# Patient Record
Sex: MALE | Race: WHITE | NOT HISPANIC OR LATINO | Employment: STUDENT | URBAN - METROPOLITAN AREA
[De-identification: names, ages, dates, MRNs, and addresses within clinical notes are randomized per-mention and may not be internally consistent; named-entity substitution may affect disease eponyms.]

---

## 2023-08-07 ENCOUNTER — HOSPITAL ENCOUNTER (EMERGENCY)
Facility: CLINIC | Age: 18
Discharge: HOME | End: 2023-08-07
Attending: STUDENT IN AN ORGANIZED HEALTH CARE EDUCATION/TRAINING PROGRAM | Admitting: STUDENT IN AN ORGANIZED HEALTH CARE EDUCATION/TRAINING PROGRAM
Payer: COMMERCIAL

## 2023-08-07 VITALS
SYSTOLIC BLOOD PRESSURE: 123 MMHG | DIASTOLIC BLOOD PRESSURE: 82 MMHG | TEMPERATURE: 98.2 F | WEIGHT: 257.8 LBS | RESPIRATION RATE: 16 BRPM | HEART RATE: 68 BPM | OXYGEN SATURATION: 98 %

## 2023-08-07 DIAGNOSIS — S00.91XA ABRASION OF HEAD, INITIAL ENCOUNTER: ICD-10-CM

## 2023-08-07 PROCEDURE — 99283 EMERGENCY DEPT VISIT LOW MDM: CPT | Performed by: STUDENT IN AN ORGANIZED HEALTH CARE EDUCATION/TRAINING PROGRAM

## 2023-08-07 PROCEDURE — 99282 EMERGENCY DEPT VISIT SF MDM: CPT | Performed by: STUDENT IN AN ORGANIZED HEALTH CARE EDUCATION/TRAINING PROGRAM

## 2023-08-08 NOTE — ED PROVIDER NOTES
ED Provider Note  [facility]      History     Chief Complaint   Patient presents with    Head Injury     Hit the top of the head on the bunk bed and was bleeding about an hour and half ago. Small lac on the left top of his head.     HPI  Ade Rodarte is a 17 year old male who presents emergency department due to a scalp laceration after hitting his head on his bunk bed.  Notes this happened about an hour and a half prior to arrival.  Had a small amount of bleeding on the top of his head and wanted to get checked out to make sure he did not need stitches.  No loss of consciousness, no numbness, tingling or weakness.  No nausea or vomiting  Past Medical History  History reviewed. No pertinent past medical history.  History reviewed. No pertinent surgical history.  No current outpatient medications on file.    No Known Allergies  Family History  History reviewed. No pertinent family history.  Social History   Social History     Tobacco Use    Smoking status: Never    Smokeless tobacco: Never   Substance Use Topics    Alcohol use: Never    Drug use: Never         A medically appropriate review of systems was performed with pertinent positives and negatives noted in the HPI, and all other systems negative.    Physical Exam   BP: 123/82  Pulse: 68  Temp: 98.2  F (36.8  C)  Resp: 16  Weight: 116.9 kg (257 lb 12.8 oz)  SpO2: 98 %  Physical Exam  GEN: Well appearing, non toxic, cooperative  HEENT: Small superficial abrasion/superficial laceration to the left anterior occiput well-approximated  CV: well-perfused, normal skin color for ethnicity  PULM: breathing comfortably, in no respiratory distress  ABD: nondistended  EXT: Full range of motion.  No edema.  NEURO: awake, conversant, grossly normal bilateral upper and lower extremity strength & ROM   SKIN: No rashes, ecchymosis, or lacerations  PSYCH: Calm and cooperative, interactive      ED Course, Procedures, & Data      Procedures          No  results found for any visits on 08/07/23.  Medications - No data to display  Labs Ordered and Resulted from Time of ED Arrival to Time of ED Departure - No data to display  No orders to display          Critical care was not performed.     Medical Decision Making  The patient's presentation was of low complexity (an acute and uncomplicated illness or injury).    The patient's evaluation involved:  review of external note(s) from 3+ sources (see separate area of note for details)  review of 3+ test result(s) ordered prior to this encounter (see separate area of note for details)    The patient's management necessitated only low risk treatment.    Assessment & Plan    17-year-old male presents emergency department due to a head injury just prior to arrival.  Has an abrasion/superficial laceration over the left frontal scalp that is well-approximated and no currently bleeding.  No other significant findings concerning for underlying skull fracture.  Low suspicion of intracranial hemorrhage.  At this point in time, due to the very close approximation, will hold on any sutures or staples at this time.  Patient is aware that he will clean and wash the area and watch for signs of infection.  Also aware of return precautions regarding his head injury and will plan for discharge.  Patient is up-to-date on Tdap    I have reviewed the nursing notes. I have reviewed the findings, diagnosis, plan and need for follow up with the patient.    New Prescriptions    No medications on file       Final diagnoses:   Abrasion of head, initial encounter       Geeta Solo MD  [facility] EMERGENCY DEPARTMENT  8/7/2023     Geeta Solo MD  08/08/23 0117     Detail Level: Simple Quality 226: Preventive Care And Screening: Tobacco Use: Screening And Cessation Intervention: Patient screened for tobacco use and is an ex/non-smoker Quality 130: Documentation Of Current Medications In The Medical Record: Current Medications Documented Quality 431: Preventive Care And Screening: Unhealthy Alcohol Use - Screening: Patient not identified as an unhealthy alcohol user when screened for unhealthy alcohol use using a systematic screening method

## 2023-08-08 NOTE — DISCHARGE INSTRUCTIONS
You are seen in the department due to an injury to your head.  You do have a small cut, but nothing that needs stitches on your head.  You have no signs of concussion at this time.    Watch for signs of concussion or seek more severe head injury including significant nausea and vomiting more than twice, numbness, tingling or weakness of your arms or legs, difficulty walking, vision changes or any other concerning symptoms.  If you develop any of these, would recommend return to emergency department for reevaluation    Otherwise, keep the area that is injured clean and dry.  You can use Neosporin or other antibiotic cream on the area.  It will start to get a crust over the's wound and we recommend that you leave this there for ongoing healing.    Return watch for signs of worsening pain, drainage that is not just bloody, or fevers which can be signs of infection

## 2023-09-14 ENCOUNTER — HOSPITAL ENCOUNTER (OUTPATIENT)
Facility: CLINIC | Age: 18
Setting detail: OBSERVATION
Discharge: HOME | End: 2023-09-16
Attending: EMERGENCY MEDICINE | Admitting: STUDENT IN AN ORGANIZED HEALTH CARE EDUCATION/TRAINING PROGRAM
Payer: COMMERCIAL

## 2023-09-14 DIAGNOSIS — K85.90 ACUTE PANCREATITIS, UNSPECIFIED COMPLICATION STATUS, UNSPECIFIED PANCREATITIS TYPE: ICD-10-CM

## 2023-09-14 LAB
ALBUMIN SERPL BCG-MCNC: 4.7 G/DL (ref 3.5–5.2)
ALP SERPL-CCNC: 66 U/L (ref 40–129)
ALT SERPL W P-5'-P-CCNC: 26 U/L (ref 0–50)
ANION GAP SERPL CALCULATED.3IONS-SCNC: 15 MMOL/L (ref 7–15)
AST SERPL W P-5'-P-CCNC: 14 U/L (ref 0–35)
BASOPHILS # BLD AUTO: 0 10E3/UL (ref 0–0.2)
BASOPHILS NFR BLD AUTO: 0 %
BILIRUB SERPL-MCNC: 0.2 MG/DL
BUN SERPL-MCNC: 13.1 MG/DL (ref 6–20)
CALCIUM SERPL-MCNC: 9.5 MG/DL (ref 8.6–10)
CHLORIDE SERPL-SCNC: 104 MMOL/L (ref 98–107)
CREAT SERPL-MCNC: 0.86 MG/DL (ref 0.67–1.17)
DEPRECATED HCO3 PLAS-SCNC: 23 MMOL/L (ref 22–29)
EGFRCR SERPLBLD CKD-EPI 2021: >90 ML/MIN/1.73M2
EOSINOPHIL # BLD AUTO: 0.1 10E3/UL (ref 0–0.7)
EOSINOPHIL NFR BLD AUTO: 1 %
ERYTHROCYTE [DISTWIDTH] IN BLOOD BY AUTOMATED COUNT: 13.4 % (ref 10–15)
GLUCOSE SERPL-MCNC: 99 MG/DL (ref 70–99)
HCT VFR BLD AUTO: 45.6 % (ref 40–53)
HGB BLD-MCNC: 15.1 G/DL (ref 13.3–17.7)
IMM GRANULOCYTES # BLD: 0 10E3/UL
IMM GRANULOCYTES NFR BLD: 0 %
LIPASE SERPL-CCNC: 1449 U/L (ref 13–60)
LYMPHOCYTES # BLD AUTO: 2 10E3/UL (ref 0.8–5.3)
LYMPHOCYTES NFR BLD AUTO: 28 %
MCH RBC QN AUTO: 28.8 PG (ref 26.5–33)
MCHC RBC AUTO-ENTMCNC: 33.1 G/DL (ref 31.5–36.5)
MCV RBC AUTO: 87 FL (ref 78–100)
MONOCYTES # BLD AUTO: 0.3 10E3/UL (ref 0–1.3)
MONOCYTES NFR BLD AUTO: 5 %
NEUTROPHILS # BLD AUTO: 4.6 10E3/UL (ref 1.6–8.3)
NEUTROPHILS NFR BLD AUTO: 66 %
NRBC # BLD AUTO: 0 10E3/UL
NRBC BLD AUTO-RTO: 0 /100
PLATELET # BLD AUTO: 228 10E3/UL (ref 150–450)
POTASSIUM SERPL-SCNC: 4.1 MMOL/L (ref 3.4–5.3)
PROT SERPL-MCNC: 7.8 G/DL (ref 6.4–8.3)
RBC # BLD AUTO: 5.24 10E6/UL (ref 4.4–5.9)
SODIUM SERPL-SCNC: 142 MMOL/L (ref 136–145)
WBC # BLD AUTO: 6.9 10E3/UL (ref 4–11)

## 2023-09-14 PROCEDURE — 99285 EMERGENCY DEPT VISIT HI MDM: CPT | Performed by: EMERGENCY MEDICINE

## 2023-09-14 PROCEDURE — 99285 EMERGENCY DEPT VISIT HI MDM: CPT | Mod: 25 | Performed by: EMERGENCY MEDICINE

## 2023-09-14 PROCEDURE — 96360 HYDRATION IV INFUSION INIT: CPT | Performed by: EMERGENCY MEDICINE

## 2023-09-14 PROCEDURE — 84478 ASSAY OF TRIGLYCERIDES: CPT | Performed by: STUDENT IN AN ORGANIZED HEALTH CARE EDUCATION/TRAINING PROGRAM

## 2023-09-14 PROCEDURE — 36415 COLL VENOUS BLD VENIPUNCTURE: CPT | Performed by: EMERGENCY MEDICINE

## 2023-09-14 PROCEDURE — 83690 ASSAY OF LIPASE: CPT | Performed by: EMERGENCY MEDICINE

## 2023-09-14 PROCEDURE — 85025 COMPLETE CBC W/AUTO DIFF WBC: CPT | Performed by: EMERGENCY MEDICINE

## 2023-09-14 PROCEDURE — 250N000013 HC RX MED GY IP 250 OP 250 PS 637: Performed by: EMERGENCY MEDICINE

## 2023-09-14 PROCEDURE — 80053 COMPREHEN METABOLIC PANEL: CPT | Performed by: EMERGENCY MEDICINE

## 2023-09-14 PROCEDURE — 258N000003 HC RX IP 258 OP 636: Performed by: EMERGENCY MEDICINE

## 2023-09-14 RX ORDER — MAGNESIUM HYDROXIDE/ALUMINUM HYDROXICE/SIMETHICONE 120; 1200; 1200 MG/30ML; MG/30ML; MG/30ML
15 SUSPENSION ORAL ONCE
Status: COMPLETED | OUTPATIENT
Start: 2023-09-14 | End: 2023-09-14

## 2023-09-14 RX ADMIN — ALUMINUM HYDROXIDE, MAGNESIUM HYDROXIDE, AND SIMETHICONE 15 ML: 200; 200; 20 SUSPENSION ORAL at 21:58

## 2023-09-14 RX ADMIN — SODIUM CHLORIDE, POTASSIUM CHLORIDE, SODIUM LACTATE AND CALCIUM CHLORIDE 1000 ML: 600; 310; 30; 20 INJECTION, SOLUTION INTRAVENOUS at 22:49

## 2023-09-14 ASSESSMENT — ACTIVITIES OF DAILY LIVING (ADL)
ADLS_ACUITY_SCORE: 35
ADLS_ACUITY_SCORE: 33

## 2023-09-14 NOTE — LETTER
[facility] [address]  [address]  [facility] [address]  Phone: [phone]  Fax: [phone]    September 16, 2023        Ade Rodarte  [address] [facility] [address]          To whom it may concern:    RE: Ade Rodarte    Patient was hospitalized under my care at the [facility] from September 14-16, 2023.  Patient may return to school and work September 17, 2023 with the following:  Ability to sit and rest as needed.    Please contact me for questions or concerns.      Sincerely,        Misa Jorge MD

## 2023-09-15 ENCOUNTER — APPOINTMENT (OUTPATIENT)
Dept: ULTRASOUND IMAGING | Facility: CLINIC | Age: 18
End: 2023-09-15
Attending: EMERGENCY MEDICINE
Payer: COMMERCIAL

## 2023-09-15 PROBLEM — K85.90 ACUTE PANCREATITIS, UNSPECIFIED COMPLICATION STATUS, UNSPECIFIED PANCREATITIS TYPE: Status: ACTIVE | Noted: 2023-09-15

## 2023-09-15 LAB
ALBUMIN SERPL BCG-MCNC: 3.9 G/DL (ref 3.5–5.2)
ALBUMIN UR-MCNC: 10 MG/DL
ALP SERPL-CCNC: 59 U/L (ref 40–129)
ALT SERPL W P-5'-P-CCNC: 22 U/L (ref 0–50)
ANION GAP SERPL CALCULATED.3IONS-SCNC: 7 MMOL/L (ref 7–15)
APPEARANCE UR: CLEAR
AST SERPL W P-5'-P-CCNC: 14 U/L (ref 0–35)
BASOPHILS # BLD AUTO: 0 10E3/UL (ref 0–0.2)
BASOPHILS NFR BLD AUTO: 0 %
BILIRUB SERPL-MCNC: 0.3 MG/DL
BILIRUB UR QL STRIP: NEGATIVE
BUN SERPL-MCNC: 9 MG/DL (ref 6–20)
CALCIUM SERPL-MCNC: 9.2 MG/DL (ref 8.6–10)
CHLORIDE SERPL-SCNC: 105 MMOL/L (ref 98–107)
COLOR UR AUTO: YELLOW
CREAT SERPL-MCNC: 0.83 MG/DL (ref 0.67–1.17)
DEPRECATED HCO3 PLAS-SCNC: 25 MMOL/L (ref 22–29)
EGFRCR SERPLBLD CKD-EPI 2021: >90 ML/MIN/1.73M2
EOSINOPHIL # BLD AUTO: 0.2 10E3/UL (ref 0–0.7)
EOSINOPHIL NFR BLD AUTO: 2 %
ERYTHROCYTE [DISTWIDTH] IN BLOOD BY AUTOMATED COUNT: 13.4 % (ref 10–15)
GLUCOSE SERPL-MCNC: 104 MG/DL (ref 70–99)
GLUCOSE UR STRIP-MCNC: NEGATIVE MG/DL
HCT VFR BLD AUTO: 41.6 % (ref 40–53)
HGB BLD-MCNC: 14.1 G/DL (ref 13.3–17.7)
HGB UR QL STRIP: NEGATIVE
IMM GRANULOCYTES # BLD: 0 10E3/UL
IMM GRANULOCYTES NFR BLD: 0 %
KETONES UR STRIP-MCNC: NEGATIVE MG/DL
LEUKOCYTE ESTERASE UR QL STRIP: NEGATIVE
LYMPHOCYTES # BLD AUTO: 2.1 10E3/UL (ref 0.8–5.3)
LYMPHOCYTES NFR BLD AUTO: 30 %
MCH RBC QN AUTO: 29 PG (ref 26.5–33)
MCHC RBC AUTO-ENTMCNC: 33.9 G/DL (ref 31.5–36.5)
MCV RBC AUTO: 86 FL (ref 78–100)
MONOCYTES # BLD AUTO: 0.5 10E3/UL (ref 0–1.3)
MONOCYTES NFR BLD AUTO: 7 %
MUCOUS THREADS #/AREA URNS LPF: PRESENT /LPF
NEUTROPHILS # BLD AUTO: 4.1 10E3/UL (ref 1.6–8.3)
NEUTROPHILS NFR BLD AUTO: 61 %
NITRATE UR QL: NEGATIVE
NRBC # BLD AUTO: 0 10E3/UL
NRBC BLD AUTO-RTO: 0 /100
PH UR STRIP: 7 [PH] (ref 5–7)
PLATELET # BLD AUTO: 205 10E3/UL (ref 150–450)
POTASSIUM SERPL-SCNC: 4.2 MMOL/L (ref 3.4–5.3)
PROT SERPL-MCNC: 6.8 G/DL (ref 6.4–8.3)
RBC # BLD AUTO: 4.86 10E6/UL (ref 4.4–5.9)
RBC URINE: <1 /HPF
SARS-COV-2 RNA RESP QL NAA+PROBE: NEGATIVE
SODIUM SERPL-SCNC: 137 MMOL/L (ref 136–145)
SP GR UR STRIP: 1.03 (ref 1–1.03)
TRIGL SERPL-MCNC: 96 MG/DL
UROBILINOGEN UR STRIP-MCNC: NORMAL MG/DL
WBC # BLD AUTO: 6.9 10E3/UL (ref 4–11)
WBC URINE: <1 /HPF

## 2023-09-15 PROCEDURE — 96361 HYDRATE IV INFUSION ADD-ON: CPT

## 2023-09-15 PROCEDURE — 81001 URINALYSIS AUTO W/SCOPE: CPT | Performed by: EMERGENCY MEDICINE

## 2023-09-15 PROCEDURE — 87635 SARS-COV-2 COVID-19 AMP PRB: CPT | Performed by: STUDENT IN AN ORGANIZED HEALTH CARE EDUCATION/TRAINING PROGRAM

## 2023-09-15 PROCEDURE — 250N000013 HC RX MED GY IP 250 OP 250 PS 637: Performed by: PHYSICIAN ASSISTANT

## 2023-09-15 PROCEDURE — 99207 PR APP CREDIT; MD BILLING SHARED VISIT: CPT | Performed by: INTERNAL MEDICINE

## 2023-09-15 PROCEDURE — G0378 HOSPITAL OBSERVATION PER HR: HCPCS

## 2023-09-15 PROCEDURE — 80053 COMPREHEN METABOLIC PANEL: CPT | Performed by: STUDENT IN AN ORGANIZED HEALTH CARE EDUCATION/TRAINING PROGRAM

## 2023-09-15 PROCEDURE — 85025 COMPLETE CBC W/AUTO DIFF WBC: CPT | Performed by: STUDENT IN AN ORGANIZED HEALTH CARE EDUCATION/TRAINING PROGRAM

## 2023-09-15 PROCEDURE — 76705 ECHO EXAM OF ABDOMEN: CPT

## 2023-09-15 PROCEDURE — 258N000003 HC RX IP 258 OP 636: Performed by: STUDENT IN AN ORGANIZED HEALTH CARE EDUCATION/TRAINING PROGRAM

## 2023-09-15 PROCEDURE — 99222 1ST HOSP IP/OBS MODERATE 55: CPT | Performed by: STUDENT IN AN ORGANIZED HEALTH CARE EDUCATION/TRAINING PROGRAM

## 2023-09-15 PROCEDURE — 36415 COLL VENOUS BLD VENIPUNCTURE: CPT | Performed by: STUDENT IN AN ORGANIZED HEALTH CARE EDUCATION/TRAINING PROGRAM

## 2023-09-15 RX ORDER — NALOXONE HYDROCHLORIDE 0.4 MG/ML
0.4 INJECTION, SOLUTION INTRAMUSCULAR; INTRAVENOUS; SUBCUTANEOUS
Status: DISCONTINUED | OUTPATIENT
Start: 2023-09-15 | End: 2023-09-16 | Stop reason: HOSPADM

## 2023-09-15 RX ORDER — PROCHLORPERAZINE MALEATE 5 MG
10 TABLET ORAL EVERY 6 HOURS PRN
Status: DISCONTINUED | OUTPATIENT
Start: 2023-09-15 | End: 2023-09-16 | Stop reason: HOSPADM

## 2023-09-15 RX ORDER — LIDOCAINE 40 MG/G
CREAM TOPICAL
Status: DISCONTINUED | OUTPATIENT
Start: 2023-09-15 | End: 2023-09-16 | Stop reason: HOSPADM

## 2023-09-15 RX ORDER — ACETAMINOPHEN 325 MG/1
975 TABLET ORAL EVERY 6 HOURS PRN
Status: DISCONTINUED | OUTPATIENT
Start: 2023-09-15 | End: 2023-09-16 | Stop reason: HOSPADM

## 2023-09-15 RX ORDER — KETOROLAC TROMETHAMINE 30 MG/ML
30 INJECTION, SOLUTION INTRAMUSCULAR; INTRAVENOUS EVERY 6 HOURS PRN
Status: DISCONTINUED | OUTPATIENT
Start: 2023-09-15 | End: 2023-09-16 | Stop reason: HOSPADM

## 2023-09-15 RX ORDER — NALOXONE HYDROCHLORIDE 0.4 MG/ML
0.2 INJECTION, SOLUTION INTRAMUSCULAR; INTRAVENOUS; SUBCUTANEOUS
Status: DISCONTINUED | OUTPATIENT
Start: 2023-09-15 | End: 2023-09-16 | Stop reason: HOSPADM

## 2023-09-15 RX ORDER — ONDANSETRON 4 MG/1
4 TABLET, ORALLY DISINTEGRATING ORAL EVERY 6 HOURS PRN
Status: DISCONTINUED | OUTPATIENT
Start: 2023-09-15 | End: 2023-09-16 | Stop reason: HOSPADM

## 2023-09-15 RX ORDER — ONDANSETRON 2 MG/ML
4 INJECTION INTRAMUSCULAR; INTRAVENOUS EVERY 6 HOURS PRN
Status: DISCONTINUED | OUTPATIENT
Start: 2023-09-15 | End: 2023-09-16 | Stop reason: HOSPADM

## 2023-09-15 RX ORDER — PROCHLORPERAZINE 25 MG
25 SUPPOSITORY, RECTAL RECTAL EVERY 12 HOURS PRN
Status: DISCONTINUED | OUTPATIENT
Start: 2023-09-15 | End: 2023-09-16 | Stop reason: HOSPADM

## 2023-09-15 RX ORDER — SODIUM CHLORIDE, SODIUM LACTATE, POTASSIUM CHLORIDE, CALCIUM CHLORIDE 600; 310; 30; 20 MG/100ML; MG/100ML; MG/100ML; MG/100ML
INJECTION, SOLUTION INTRAVENOUS CONTINUOUS
Status: DISCONTINUED | OUTPATIENT
Start: 2023-09-15 | End: 2023-09-15

## 2023-09-15 RX ORDER — KETOROLAC TROMETHAMINE 30 MG/ML
30 INJECTION, SOLUTION INTRAMUSCULAR; INTRAVENOUS EVERY 6 HOURS PRN
Status: DISCONTINUED | OUTPATIENT
Start: 2023-09-15 | End: 2023-09-15

## 2023-09-15 RX ORDER — OXYCODONE HYDROCHLORIDE 5 MG/1
5 TABLET ORAL EVERY 6 HOURS PRN
Status: DISCONTINUED | OUTPATIENT
Start: 2023-09-15 | End: 2023-09-16 | Stop reason: HOSPADM

## 2023-09-15 RX ORDER — BENZONATATE 100 MG/1
100 CAPSULE ORAL 3 TIMES DAILY PRN
Status: DISCONTINUED | OUTPATIENT
Start: 2023-09-15 | End: 2023-09-16 | Stop reason: HOSPADM

## 2023-09-15 RX ORDER — HYDROMORPHONE HCL IN WATER/PF 6 MG/30 ML
0.2 PATIENT CONTROLLED ANALGESIA SYRINGE INTRAVENOUS
Status: DISCONTINUED | OUTPATIENT
Start: 2023-09-15 | End: 2023-09-16 | Stop reason: HOSPADM

## 2023-09-15 RX ADMIN — Medication 1 LOZENGE: at 23:17

## 2023-09-15 RX ADMIN — SODIUM CHLORIDE, POTASSIUM CHLORIDE, SODIUM LACTATE AND CALCIUM CHLORIDE: 600; 310; 30; 20 INJECTION, SOLUTION INTRAVENOUS at 04:34

## 2023-09-15 ASSESSMENT — ACTIVITIES OF DAILY LIVING (ADL)
ADLS_ACUITY_SCORE: 35
ADLS_ACUITY_SCORE: 31
ADLS_ACUITY_SCORE: 35

## 2023-09-15 NOTE — PHARMACY-ADMISSION MEDICATION HISTORY
Pharmacist Admission Medication History    Admission medication history is complete. The information provided in this note is only as accurate as the sources available at the time of the update.    Medication reconciliation/reorder completed by provider prior to medication history? No    Information Source(s): Patient via in-person    Pertinent Information: patient reports taking to prescription or over the counter medications at this time.     Changes made to PTA medication list:  Added: None  Deleted: None  Changed: None    Medication Affordability: unable to assess     Allergies reviewed with patient and updates made in EHR: yes    Medication History Completed By: Karla Gonzalez RPH 9/15/2023 2:36 PM    Prior to Admission medications    Not on File

## 2023-09-15 NOTE — H&P
[facility]    History and Physical - Hospitalist Service       Date of Admission:  9/14/2023    Assessment & Plan      Ade Rodarte is a 17 year old male with no significant past medical history presenting with abdominal pain, subsequently found to have a lipase of 1,449, consistent with acute pancreatitis.     #Acute Pancreatitis  Presented with abdominal pain. Lipase of 1,449, consistent with acute pancreatitis. RUQ ultrasound normal. Differential etiologies includes EtOH (however, patient denies alcohol use and has a nl abdominal ultrasound), gallstones (however, alkaline phosphatase wnl and has a nl abdominal ultrasound), hypertriglyceridemia (triglycerides elevated, but not to the level that would account for pancreatitis), medication-induced (on no medications chronically), hypercalcemia (calcium within normal limits w/ normal albumin), anatomical (although visualized portion on ultrasound were normal), infections (no localizing symptoms of infection), toxins (no reported toxin exposure), and genetic risk.  - Regular diet (has been tolerating)  - mIVF (LR at 250 ml/hr)  - pain control with acetaminophen 975 mg q6H (mild pain), Toradol 30 mg IV (mild pain), oxycodone 5mg q6Hr (moderate pain), and dilaudid 0.2mg once prn (severe breakthrough pain).   - Discussed patient concerns about whether this might recur and if there would be an outpatient treatment plan. Provided reassurance and education.     Diet:  Regular diet  DVT Prophylaxis: Low Risk/Ambulatory with no VTE prophylaxis indicated  Martinez Catheter: Not present  Lines: None     Cardiac Monitoring: None  Code Status:  Full code    Clinically Significant Risk Factors Present on Admission                                  Disposition Plan      Expected Discharge Date: 09/17/2023                  Gilmar Parry MD  Hospitalist Service  [facility]  Securely message  with Josue (more info)  Text page via [facility] Paging/Directory     ______________________________________________________________________    Chief Complaint   Abdominal pain     History is obtained from the patient    History of Present Illness   Ade Rodarte is a 17 year old male with no significant past medical history presenting with abdominal pain that lasted for several seconds, then would go away. He continued to eat normally, nor did he experience nausea or vomiting. However, on the day of presentation, he had worsening and persistent abdominal pain and presented to the emergency department.    In the emergency department, his vital signs were within acceptable limits. CMP and CBC were normal. Lipase was elevated at 1,499. Abdominal ultrasound was normal and without abnormality. He was given a lactated ringer's bolus and a maalox suspension. He was admitted to the inpatient unit for acute pancreatitis.     Past Medical History    None    Past Surgical History   None    Prior to Admission Medications   None        Physical Exam   Weight: 262 lbs 0 oz  Vital signs: Temp:  [98.3  F (36.8  C)] 98.3  F (36.8  C)  Pulse:  [95] 95  Resp:  [16] 16  BP: (129)/(72) 129/72  SpO2:  [97 %] 97 %  General Appearance: Well developed, well nourished, in no acute distress.   Skin: No rashes, ulcerations, or petechiae.  HEENT: EOMI intact, anicteric sclera, clear conjunctiva, normal external ear anatomy, normal nose anatomy, no lesions, scars, or masses.   Neck: No masses, supple  Cardiovascular: RRR, no murmurs, rubs, or gallops.  Lungs: clear to ascultation bilaterally, no rales, wheezes or rhonchi.   Abdomen: Non-tender, non-distended abdomen.   Musculoskeletal: Muscle tone normal.  Neurologic: Alert and oriented x 3. Normal affect. CX II-XII grossly intact.      Medical Decision Making       45 MINUTES SPENT BY ME on the date of service doing chart review, history, exam, documentation & further activities per the note.      Data   Results for orders placed or performed during the hospital encounter of 09/14/23 (from the past 24 hour(s))   CBC with Platelets & Differential    Narrative    The following orders were created for panel order CBC with Platelets & Differential.  Procedure                               Abnormality         Status                     ---------                               -----------         ------                     CBC with platelets and d...[[id number]]                      Final result                 Please view results for these tests on the individual orders.   Comprehensive metabolic panel   Result Value Ref Range    Sodium 142 136 - 145 mmol/L    Potassium 4.1 3.4 - 5.3 mmol/L    Chloride 104 98 - 107 mmol/L    Carbon Dioxide (CO2) 23 22 - 29 mmol/L    Anion Gap 15 7 - 15 mmol/L    Urea Nitrogen 13.1 6.0 - 20.0 mg/dL    Creatinine 0.86 0.67 - 1.17 mg/dL    Calcium 9.5 8.6 - 10.0 mg/dL    Glucose 99 70 - 99 mg/dL    Alkaline Phosphatase 66 40 - 129 U/L    AST 14 0 - 35 U/L    ALT 26 0 - 50 U/L    Protein Total 7.8 6.4 - 8.3 g/dL    Albumin 4.7 3.5 - 5.2 g/dL    Bilirubin Total 0.2 <=1.2 mg/dL    GFR Estimate >90 >60 mL/min/1.73m2   Lipase   Result Value Ref Range    Lipase 1,449 (H) 13 - 60 U/L   CBC with platelets and differential   Result Value Ref Range    WBC Count 6.9 4.0 - 11.0 10e3/uL    RBC Count 5.24 4.40 - 5.90 10e6/uL    Hemoglobin 15.1 13.3 - 17.7 g/dL    Hematocrit 45.6 40.0 - 53.0 %    MCV 87 78 - 100 fL    MCH 28.8 26.5 - 33.0 pg    MCHC 33.1 31.5 - 36.5 g/dL    RDW 13.4 10.0 - 15.0 %    Platelet Count 228 150 - 450 10e3/uL    % Neutrophils 66 %    % Lymphocytes 28 %    % Monocytes 5 %    % Eosinophils 1 %    % Basophils 0 %    % Immature Granulocytes 0 %    NRBCs per 100 WBC 0 <1 /100    Absolute Neutrophils 4.6 1.6 - 8.3 10e3/uL    Absolute Lymphocytes 2.0 0.8 - 5.3 10e3/uL    Absolute Monocytes 0.3 0.0 - 1.3 10e3/uL    Absolute Eosinophils 0.1 0.0 - 0.7 10e3/uL    Absolute  Basophils 0.0 0.0 - 0.2 10e3/uL    Absolute Immature Granulocytes 0.0 <=0.4 10e3/uL    Absolute NRBCs 0.0 10e3/uL   Abdomen US, limited (RUQ only)    Narrative    EXAM: US ABDOMEN LIMITED  LOCATION: [facility]  DATE: 9/15/2023    INDICATION: abd pain, elevated lipase, ebal for gallstone  COMPARISON: None.  TECHNIQUE: Limited abdominal ultrasound.    FINDINGS:    GALLBLADDER: Normal. No gallstones, wall thickening, or pericholecystic fluid. Negative sonographic Venegas's sign.    BILE DUCTS: No biliary dilatation. The common duct measures 2 mm.    LIVER: Normal parenchyma with smooth contour. No focal mass.    RIGHT KIDNEY: No hydronephrosis.    PANCREAS: The visualized portions are normal.    No ascites.      Impression    IMPRESSION:  1.  Normal limited abdominal ultrasound.

## 2023-09-15 NOTE — ED PROVIDER NOTES
"    [facility] EMERGENCY DEPARTMENT ([facility])    9/14/23      ED PROVIDER NOTE      History     Chief Complaint   Patient presents with    Abdominal Pain     HPI    Ade Rodarte is a 17 year old male who presents to the ED for evaluation of intermittent epigastric abdominal pain. Patient reports that this pain has been going on for 1 to 2 weeks.  It is occurring mainly on a daily basis, he notices it for perhaps 10 seconds at a time when he does notice it.  He describes it as a sharp burning.  Does not seem to be affected by whether he eats or what he eats.  He denies any nausea or vomiting.  No diarrhea, no melena or hematochezia.  Last BM was earlier today and was normal.  Patient has not taken anything for the symptoms.    He denies fevers or chills, nasal congestion or sore throat, no cough, shortness of breath, or chest pain.  No dysuria or hematuria.  No previous abdominal surgeries.  Patient denies alcohol use, denies NSAID use.  No prior history of peptic ulcer disease.    Past Medical History  History reviewed. No pertinent past medical history.  History reviewed. No pertinent surgical history.  No current outpatient medications on file.    No Known Allergies  Family History  History reviewed. No pertinent family history.  Social History   Social History     Tobacco Use    Smoking status: Never    Smokeless tobacco: Never   Substance Use Topics    Alcohol use: Never    Drug use: Never      Past medical history, past surgical history, medications, allergies, family history, and social history were reviewed with the patient. No additional pertinent items.      A medically appropriate review of systems was performed with pertinent positives and negatives noted in the HPI, and all other systems negative.    Physical Exam   BP: 129/72  Pulse: 95  Temp: 98.3  F (36.8  C)  Resp: 16  Height: 203.2 cm (6' 8\")  Weight: 118.8 kg (262 lb)  SpO2: 97 %  Physical Exam  Vitals and nursing note reviewed. "   Constitutional:       General: He is not in acute distress.     Appearance: He is not diaphoretic.      Comments: Adult male, sitting up in bed, no acute distress   HENT:      Head: Atraumatic.      Mouth/Throat:      Mouth: Mucous membranes are moist.      Pharynx: Oropharynx is clear. No oropharyngeal exudate.   Eyes:      General: No scleral icterus.     Pupils: Pupils are equal, round, and reactive to light.   Cardiovascular:      Rate and Rhythm: Normal rate.      Pulses: Normal pulses.      Heart sounds: No murmur heard.  Pulmonary:      Effort: No respiratory distress.      Breath sounds: Normal breath sounds.   Abdominal:      General: Bowel sounds are normal.      Palpations: Abdomen is soft.      Tenderness: There is abdominal tenderness. There is no guarding or rebound.      Comments: Abdomen is flat, soft, mildly tender to palpation in the epigastrium without guarding or rebound, slightly hypoactive bowel sounds   Musculoskeletal:         General: No tenderness.   Skin:     General: Skin is warm.      Findings: No rash.   Neurological:      General: No focal deficit present.   Psychiatric:         Mood and Affect: Mood normal.         ED Course, Procedures, & Data      Procedures                   Results for orders placed or performed during the hospital encounter of 09/14/23   Comprehensive metabolic panel     Status: Normal   Result Value Ref Range    Sodium 142 136 - 145 mmol/L    Potassium 4.1 3.4 - 5.3 mmol/L    Chloride 104 98 - 107 mmol/L    Carbon Dioxide (CO2) 23 22 - 29 mmol/L    Anion Gap 15 7 - 15 mmol/L    Urea Nitrogen 13.1 6.0 - 20.0 mg/dL    Creatinine 0.86 0.67 - 1.17 mg/dL    Calcium 9.5 8.6 - 10.0 mg/dL    Glucose 99 70 - 99 mg/dL    Alkaline Phosphatase 66 40 - 129 U/L    AST 14 0 - 35 U/L    ALT 26 0 - 50 U/L    Protein Total 7.8 6.4 - 8.3 g/dL    Albumin 4.7 3.5 - 5.2 g/dL    Bilirubin Total 0.2 <=1.2 mg/dL    GFR Estimate >90 >60 mL/min/1.73m2   Lipase     Status: Abnormal    Result Value Ref Range    Lipase 1,449 (H) 13 - 60 U/L   CBC with platelets and differential     Status: None   Result Value Ref Range    WBC Count 6.9 4.0 - 11.0 10e3/uL    RBC Count 5.24 4.40 - 5.90 10e6/uL    Hemoglobin 15.1 13.3 - 17.7 g/dL    Hematocrit 45.6 40.0 - 53.0 %    MCV 87 78 - 100 fL    MCH 28.8 26.5 - 33.0 pg    MCHC 33.1 31.5 - 36.5 g/dL    RDW 13.4 10.0 - 15.0 %    Platelet Count 228 150 - 450 10e3/uL    % Neutrophils 66 %    % Lymphocytes 28 %    % Monocytes 5 %    % Eosinophils 1 %    % Basophils 0 %    % Immature Granulocytes 0 %    NRBCs per 100 WBC 0 <1 /100    Absolute Neutrophils 4.6 1.6 - 8.3 10e3/uL    Absolute Lymphocytes 2.0 0.8 - 5.3 10e3/uL    Absolute Monocytes 0.3 0.0 - 1.3 10e3/uL    Absolute Eosinophils 0.1 0.0 - 0.7 10e3/uL    Absolute Basophils 0.0 0.0 - 0.2 10e3/uL    Absolute Immature Granulocytes 0.0 <=0.4 10e3/uL    Absolute NRBCs 0.0 10e3/uL   CBC with Platelets & Differential     Status: None    Narrative    The following orders were created for panel order CBC with Platelets & Differential.  Procedure                               Abnormality         Status                     ---------                               -----------         ------                     CBC with platelets and d...[[id number]]                      Final result                 Please view results for these tests on the individual orders.     Medications   alum & mag hydroxide-simethicone (MAALOX) suspension 15 mL (15 mLs Oral $Given 9/14/23 5892)   lactated ringers BOLUS 1,000 mL (0 mLs Intravenous Stopped 9/15/23 0003)     Labs Ordered and Resulted from Time of ED Arrival to Time of ED Departure   LIPASE - Abnormal       Result Value    Lipase 1,449 (*)    COMPREHENSIVE METABOLIC PANEL - Normal    Sodium 142      Potassium 4.1      Chloride 104      Carbon Dioxide (CO2) 23      Anion Gap 15      Urea Nitrogen 13.1      Creatinine 0.86      Calcium 9.5      Glucose 99      Alkaline Phosphatase 66       AST 14      ALT 26      Protein Total 7.8      Albumin 4.7      Bilirubin Total 0.2      GFR Estimate >90     CBC WITH PLATELETS AND DIFFERENTIAL    WBC Count 6.9      RBC Count 5.24      Hemoglobin 15.1      Hematocrit 45.6      MCV 87      MCH 28.8      MCHC 33.1      RDW 13.4      Platelet Count 228      % Neutrophils 66      % Lymphocytes 28      % Monocytes 5      % Eosinophils 1      % Basophils 0      % Immature Granulocytes 0      NRBCs per 100 WBC 0      Absolute Neutrophils 4.6      Absolute Lymphocytes 2.0      Absolute Monocytes 0.3      Absolute Eosinophils 0.1      Absolute Basophils 0.0      Absolute Immature Granulocytes 0.0      Absolute NRBCs 0.0     ROUTINE UA WITH MICROSCOPIC REFLEX TO CULTURE     Abdomen US, limited (RUQ only)    (Results Pending)          Critical care was not performed.     Medical Decision Making  The patient's presentation was of moderate complexity (an acute illness with systemic symptoms).    The patient's evaluation involved:  review of external note(s) from 1 sources (see separate area of note for details)  ordering and/or review of 3+ test(s) in this encounter (see separate area of note for details)    The patient's management necessitated high risk (a decision regarding hospitalization).    Assessment & Plan    This is a 17 year old male who presents to the ED for evaluation of intermittent epigastric abdominal pain. On my evaluation, patient is alert, cooperative, does not appear to be in any acute distress.  Vital signs are within normal limits.  He has mild tenderness to palpation in the epigastrium without guarding or rebound, remainder of abdominal exam is benign and unremarkable.    Differential diagnosis could include gastritis, peptic ulcer disease, GERD, also need to consider pancreatitis, hepatitis, biliary etiology, bowel obstruction, amongst many others.  We did establish IV access and did draw blood for laboratory analysis.  CBC is WNL, CMP is WNL,  lipase is elevated at 1449.      Initially patient was given a GI cocktail.  I did review the patient's test results with him where I explained that he does have evidence of acute pancreatitis.  He is feeling improved and does not have pain at present.  I have ordered a liter of Lactated Ringer's for him.  Ultrasound is pending.  Need to consider the possibility of obstructing stone though the clinical story does not seem consistent with this.  Alternatively, some sort of anatomic abnormality of the pancreas could also be possible.    Given the patient's significantly elevated lipase, I would recommend that he be admitted to the hospital.  Patient is agreeable to this.  Patient will be signed out to overnight physician, Dr. Frost, to follow-up on ultrasound result, depending on this he may need to be admitted to the [facility] (if there is ultrasound evidence of obstructing stone which may require a procedure), or he could be admitted to [facility] if there is no evidence of obstructing stone.    I have reviewed the nursing notes. I have reviewed the findings, diagnosis, plan and need for follow up with the patient.    New Prescriptions    No medications on file       Final diagnoses:   Acute pancreatitis, unspecified complication status, unspecified pancreatitis type   I, Angie Swanson, am serving as a trained medical scribe to document services personally performed by Danisha Odonnell MD, based on the provider's statements to me.      I, Danisha Odonnell MD, was physically present and have reviewed and verified the accuracy of this note documented by Angie Swanson.     Danisha Odonnell MD  [facility] EMERGENCY DEPARTMENT  9/14/2023     Danisha Odonnell MD  09/15/23 0021

## 2023-09-15 NOTE — PROGRESS NOTES
[facility]    Medicine Progress Note - Hospitalist Service, GOLD TEAM 21    Date of Admission:  9/14/2023    Assessment & Plan   Ade Rodarte is a 17 year old male with no significant past medical history presenting with abdominal pain, subsequently found to have a lipase of 1,449, consistent with acute pancreatitis.      #Acute Pancreatitis: Etiology uncertain  Presented with abdominal pain. Lipase of 1,449, consistent with acute pancreatitis. RUQ ultrasound normal. Differential etiologies includes EtOH (however, patient denies alcohol use and has a nl abdominal ultrasound), gallstones (however, alkaline phosphatase wnl and has a nl abdominal ultrasound), hypertriglyceridemia (triglycerides elevated, but not to the level that would account for pancreatitis), medication-induced (on no medications chronically), hypercalcemia (calcium within normal limits w/ normal albumin), anatomical (although visualized portion on ultrasound were normal), infections (no localizing symptoms of infection), toxins (no reported toxin exposure), and genetic risk.  -Continue regular diet (has been tolerating)  - Will discontinue maintenance IV fluid as patient tolerating p.o. intake today without nausea or vomiting  - Pain control with acetaminophen 975 mg q6H (mild pain), Toradol 30 mg IV (mild pain), oxycodone 5mg q6Hr (moderate pain), and dilaudid 0.2mg once prn (severe breakthrough pain)--> patient not using any of these agents for pain  - Discussed with patient today given uncertainty of cause for pancreatitis and young age of onset, recommend referral to GI clinic (panc/bili clinic) for further evaluation of congenital or autoimmune predisposition for acute pancreatitis and patient agreeable.       Diet: Regular Diet Adult    DVT Prophylaxis: Low Risk/Ambulatory with no VTE prophylaxis indicated  Martinez Catheter: Not present  Lines: None     Cardiac Monitoring: None  Code Status: Full  "Code      Clinically Significant Risk Factors Present on Admission                                  Disposition: Discharge to home as early as tomorrow 9/16 if patient continues with pain control, toleration of p.o. intake and able to maintain hydration with p.o. intake.    Misa Jorge MD  Hospitalist Service, GOLD TEAM 21  [facility]  Securely message with [employer] (more info)  Text page via [facility] Paging/Directory   See signed in provider for up to date coverage information  ______________________________________________________________________    Interval History   Patient notes he is feeling reasonably well overall, pain \"comes and goes\" but he has not needed any medication for pain so far during hospital stay.  Patient denies nausea, ate a full breakfast without increased pain or onset of nausea.  No dyspnea, ambulating without difficulty.    Physical Exam   Vital Signs: Temp: 98.4  F (36.9  C) Temp src: Oral BP: 126/84 Pulse: 82   Resp: 16 SpO2: 99 % O2 Device: None (Room air)    Weight: 262 lbs 0 oz    Gen: NAD, sitting up comfortably in bed, pleasant and cooperative with interview and exam  HEENT: normocephalic, no scleral icterus, no perioral lesions, oral mucosa moist  CV: RRR at time of exam  Pulm: good air movement bilaterally without wheezing  Abd: soft, +bs, mild tenderness to palpation in mid epigastric and right upper quadrant without rebound or guarding, nondistended  LE: no edema bilaterally  Skin: no rash or jaundice on limited exam  Neuro: AOx3, no tremor, gait stable  Psych: mood-affect congruent      Medical Decision Making             Data   Most Recent 3 CBC's:  Recent Labs   Lab Test 09/15/23  0655 09/14/23 2117   WBC 6.9 6.9   HGB 14.1 15.1   MCV 86 87    228     Most Recent 3 BMP's:  Recent Labs   Lab Test 09/15/23  0655 09/14/23 2117    142   POTASSIUM 4.2 4.1   CHLORIDE 105 104   CO2 25 23   BUN 9.0 13.1   CR 0.83 0.86 "   ANIONGAP 7 15   MELANIA 9.2 9.5   * 99

## 2023-09-15 NOTE — ED TRIAGE NOTES
Patient reported intermittent epigastric pain for a week. He said it does not matter if he is eating or hungry when pain occurs. Pt denies nausea.      Triage Assessment       Row Name 09/14/23 1952       Triage Assessment (Adult)    Airway WDL WDL       Respiratory WDL    Respiratory WDL WDL       Skin Circulation/Temperature WDL    Skin Circulation/Temperature WDL WDL       Cardiac WDL    Cardiac WDL WDL       Peripheral/Neurovascular WDL    Peripheral Neurovascular WDL WDL       Cognitive/Neuro/Behavioral WDL    Cognitive/Neuro/Behavioral WDL WDL

## 2023-09-15 NOTE — PLAN OF CARE
"VS:       Pt A/O X 4. Afebrile. VSS./74 (BP Location: Right arm)   Pulse 79   Temp 98.5  F (36.9  C) (Oral)   Resp 16   Ht 2.032 m (6' 8\")   Wt 118.8 kg (262 lb)   SpO2 95%   BMI 28.78 kg/m    Lungs- clear bilaterally with both anterior and posterior. IS encouraged. Denies nausea, shortness of breath, and chest pain.     Output:       Bowels- active in all four quadrants. Voids spontaneously without difficulty in the bathroom. Abdominal discomfort.       Activity:       Pt up independently.      Skin:   Skin intact.     Pain:       Has pain in the abdomin and refuses any pain medications at this time.Pt is tolerating well.       CMS:       CMS and Neuro's are intact. Denies numbness and tingling in all extremities.      Dressing:       No incisional dressing.      Diet:       Pt is on a regular diet and appetite was good this shift.       LDA:       PIV is patent in the L forearm and SL.      Equipment:       Pt denies PCD's on BLE's. Bilateral heels are elevated off the bed.     Plan:       Pt is able to make needs known and the call light is within the pt's reach. Continue to monitor.       Additional Info:              "

## 2023-09-15 NOTE — UTILIZATION REVIEW
"   Admission Status; Secondary Review Determination         Under the authority of the Utilization Management Committee, the utilization review process indicated a secondary review on the above patient.  The review outcome is based on review of the medical records, discussions with staff, and applying clinical experience noted on the date of the review.          (x) Observation Status Appropriate - This patient does not meet hospital inpatient criteria and is placed in observation status. If this patient's primary payer is Medicare and was admitted as an inpatient, Condition Code 44 should be used and patient status changed to \"observation\".     RATIONALE FOR DETERMINATION   17-year-old male with no significant medical history presented to the emergency department with abdominal pain that initially came and went but then worsened persistently. Vital signs were stable, and initial laboratory tests including CMP and CBC were normal except for an elevated lipase level of 1,449, indicative of acute pancreatitis. An abdominal ultrasound showed no abnormalities. Possible causes of pancreatitis, including alcohol use, gallstones, hypertriglyceridemia, medication-induced, hypercalcemia, anatomical issues, infections, toxins, and genetic risk, were considered. The patient was placed on a regular diet, received intravenous fluids at 250 ml/hr, and was provided with pain control medications.  Has not had any pain medication since admission  The severity of illness, intensity of service provided, expected LOS and risk for adverse outcome make the care appropriate for further observation; however, doesn't meet criteria for hospital inpatient admission. Dr Jorge   notified of this determination.    This document was produced using voice recognition software.      The information on this document is developed by the utilization review team in order for the business office to ensure compliance.  This only denotes the " appropriateness of proper admission status and does not reflect the quality of care rendered.         The definitions of Inpatient Status and Observation Status used in making the determination above are those provided in the CMS Coverage Manual, Chapter 1 and Chapter 6, section 70.4.      Sincerely,     RAMSES NORIEGA MD    System Medical Director  Utilization Management  [facility].

## 2023-09-15 NOTE — ED NOTES
"     Emergency Department Patient Sign-out       Brief HPI and ED course:  Patient is a 17 year old male signed out to me by Dr. Odonnell.  See initial ED Provider note for details of the presentation. In brief, patient with brief intermittent epigastric pain over a week or so. Lipase elevated to ~1400. RUQ US pending.     Vitals:   Patient Vitals for the past 24 hrs:   BP Temp Temp src Pulse Resp SpO2 Height Weight   09/14/23 1951 129/72 98.3  F (36.8  C) Oral 95 16 97 % 2.032 m (6' 8\") 118.8 kg (262 lb)       Received Sign-out Plan:    Pending studies include: RUQ US      Plan:   - f/u US, likely admit         Medical Decision Making  The patient's presentation was of moderate complexity (an acute illness with systemic symptoms).    The patient's evaluation involved:  ordering and/or review of 3+ test(s) in this encounter (see separate area of note for details)  independent interpretation of testing performed by another health professional (RUQ US)  discussion of management or test interpretation with another health professional (admitting hospitalist)    The patient's management necessitated high risk (a decision regarding hospitalization).        Events after assuming care:  After care was assumed, a focused history and physical was performed. Agree with findings relayed by previous provider.  Lipase was significantly elevated without LFT elevations, without electrolyte abnormalities, and without evidence of infection.    Right upper quadrant ultrasound without evidence of gallstones or duct dilation.  No alcohol use by patient report.  Unclear etiology of the patient's acute pancreatitis.      After counseling on the diagnosis, work-up, and treatment plan the patient was admitted to medicine.  Patient management was discussed with the admitting hospitalist.     --  Elian Frost MD   Emergency Medicine       Elian Frost MD  09/15/23 0204    "

## 2023-09-16 VITALS
HEART RATE: 96 BPM | BODY MASS INDEX: 30.31 KG/M2 | RESPIRATION RATE: 18 BRPM | DIASTOLIC BLOOD PRESSURE: 80 MMHG | HEIGHT: 78 IN | SYSTOLIC BLOOD PRESSURE: 139 MMHG | WEIGHT: 262 LBS | TEMPERATURE: 98.5 F | OXYGEN SATURATION: 97 %

## 2023-09-16 LAB
ALBUMIN SERPL BCG-MCNC: 4.2 G/DL (ref 3.5–5.2)
ALP SERPL-CCNC: 69 U/L (ref 40–129)
ALT SERPL W P-5'-P-CCNC: 22 U/L (ref 0–50)
ANION GAP SERPL CALCULATED.3IONS-SCNC: 13 MMOL/L (ref 7–15)
AST SERPL W P-5'-P-CCNC: 17 U/L (ref 0–35)
BILIRUB SERPL-MCNC: 0.4 MG/DL
BUN SERPL-MCNC: 10.6 MG/DL (ref 6–20)
CALCIUM SERPL-MCNC: 9.4 MG/DL (ref 8.6–10)
CHLORIDE SERPL-SCNC: 102 MMOL/L (ref 98–107)
CREAT SERPL-MCNC: 0.86 MG/DL (ref 0.67–1.17)
DEPRECATED HCO3 PLAS-SCNC: 21 MMOL/L (ref 22–29)
EGFRCR SERPLBLD CKD-EPI 2021: >90 ML/MIN/1.73M2
ERYTHROCYTE [DISTWIDTH] IN BLOOD BY AUTOMATED COUNT: 13.2 % (ref 10–15)
GLUCOSE SERPL-MCNC: 104 MG/DL (ref 70–99)
HCT VFR BLD AUTO: 44.9 % (ref 40–53)
HGB BLD-MCNC: 15 G/DL (ref 13.3–17.7)
MCH RBC QN AUTO: 28.2 PG (ref 26.5–33)
MCHC RBC AUTO-ENTMCNC: 33.4 G/DL (ref 31.5–36.5)
MCV RBC AUTO: 85 FL (ref 78–100)
PLATELET # BLD AUTO: 211 10E3/UL (ref 150–450)
POTASSIUM SERPL-SCNC: 4.3 MMOL/L (ref 3.4–5.3)
PROT SERPL-MCNC: 7.3 G/DL (ref 6.4–8.3)
RBC # BLD AUTO: 5.31 10E6/UL (ref 4.4–5.9)
SODIUM SERPL-SCNC: 136 MMOL/L (ref 136–145)
WBC # BLD AUTO: 6.4 10E3/UL (ref 4–11)

## 2023-09-16 PROCEDURE — G0378 HOSPITAL OBSERVATION PER HR: HCPCS

## 2023-09-16 PROCEDURE — 99238 HOSP IP/OBS DSCHRG MGMT 30/<: CPT | Performed by: INTERNAL MEDICINE

## 2023-09-16 PROCEDURE — 85027 COMPLETE CBC AUTOMATED: CPT | Performed by: INTERNAL MEDICINE

## 2023-09-16 PROCEDURE — 36415 COLL VENOUS BLD VENIPUNCTURE: CPT | Performed by: INTERNAL MEDICINE

## 2023-09-16 PROCEDURE — 80053 COMPREHEN METABOLIC PANEL: CPT | Performed by: INTERNAL MEDICINE

## 2023-09-16 RX ORDER — ONDANSETRON 4 MG/1
4 TABLET, ORALLY DISINTEGRATING ORAL EVERY 6 HOURS PRN
Qty: 8 TABLET | Refills: 0 | Status: SHIPPED | OUTPATIENT
Start: 2023-09-16 | End: 2023-11-10

## 2023-09-16 RX ORDER — ACETAMINOPHEN 325 MG/1
975 TABLET ORAL EVERY 8 HOURS PRN
Qty: 27 TABLET | Refills: 0 | Status: SHIPPED | OUTPATIENT
Start: 2023-09-16 | End: 2023-11-10

## 2023-09-16 ASSESSMENT — ACTIVITIES OF DAILY LIVING (ADL)
ADLS_ACUITY_SCORE: 31

## 2023-09-16 NOTE — PLAN OF CARE
"Goal Outcome Evaluation:    Plan of Care Reviewed With: patient  Overall Patient Progress: improvingOverall Patient Progress: improving  Outcome Evaluation: Pt required no pain meds overnight    /78 (BP Location: Right arm)   Pulse 92   Temp 99.5  F (37.5  C) (Oral)   Resp 18   Ht 2.032 m (6' 8\")   Wt 118.8 kg (262 lb)   SpO2 98%   BMI 28.78 kg/m      Pt admitted 9/14 for acute pancreatitis and is currently on observation. Pt reported no pain overnight and denies N/V, CP, SOB. Observation handout and document signed and in binder. Refer to flowsheet for assessments.      Observation Goals:  @2012-9058:  1) Tolerating PO intake: Tolerating well  2) Pain controlled: No pain reported  3) Not requiring IV antiemetics or pain medications: No nausea or pain reported  4) Maintaining hydration status without IVF: Had 828ml tea before bed  5) Labs in acceptable range: 9/15 AM labs WNL    @4000-1253:  1) Tolerating PO intake: Tolerating well  2) Pain controlled: No pain reported  3) Not requiring IV antiemetics or pain medications: No nausea or pain reported  4) Maintaining hydration status without IVF: Pt sleeping; fluids at bedside  5) Labs in acceptable range: 9/15 AM labs WNL    @5072-5757:  1) Tolerating PO intake: Tolerating well  2) Pain controlled: No pain reported  3) Not requiring IV antiemetics or pain medications: No nausea or pain reported  4) Maintaining hydration status without IVF: Pt sleeping; fluids at bedside  5) Labs in acceptable range: Lab draw ordered @0600  "

## 2023-09-16 NOTE — DISCHARGE SUMMARY
[facility]  Hospitalist Discharge Summary      Date of Admission:  9/14/2023  Date of Discharge:  9/16/2023  1:09 PM  Discharging Provider: Misa Jorge MD  Discharge Service: Hospitalist Service, GOLD TEAM 21    Discharge Diagnoses   Acute pancreatitis    Clinically Significant Risk Factors          Follow-ups Needed After Discharge   With uncertainty of cause for pancreatitis and young age of onset, recommended referral to GI clinic (panc/bili clinic) for further evaluation of congenital or autoimmune predisposition for acute pancreatitis and patient agreeable and referral placed. Pt already has follow up with PCP in two weeks and I encouraged him to keep this appointment.        Discharge Disposition   Discharged to home  Condition at discharge: Stable    Hospital Course   Ade Rodarte is a 17 year old male with no significant past medical history presenting with abdominal pain, subsequently found to have a lipase of 1,449, consistent with acute pancreatitis.      #Acute Pancreatitis: Etiology uncertain  Presented with abdominal pain. Lipase of 1,449, consistent with acute pancreatitis. RUQ ultrasound normal. LFTs within normal limits. Differential etiologies includes EtOH (however, patient denies alcohol use), gallstones (however, alkaline phosphatase wnl and has a normal abdominal ultrasound), hypertriglyceridemia (triglycerides elevated, but not to the level that would account for pancreatitis), medication-induced (on no medications chronically), hypercalcemia (calcium within normal limits w/ normal albumin), anatomical (although visualized portion on ultrasound were normal), infections (no localizing symptoms of infection), toxins (no reported toxin exposure), and genetic risk/autoimmune cause. Pt treated with supportive cares with IVF, had PRN pain and antiemetic medications ordered (but he didn't use). Tolerating regular diet early, pain improved by  day of discharge and tolerating PO intake and maintaining hydration without IVF. With uncertainty of cause for pancreatitis and young age of onset, recommended referral to GI clinic (panc/bili clinic) for further evaluation of congenital or autoimmune predisposition for acute pancreatitis and patient agreeable and referral placed. Pt already has follow up with PCP in two weeks and I encouraged him to keep this appointment. Letter provided to patient for school.    Consultations This Hospital Stay   None    Code Status   Prior    Time Spent on this Encounter   I, Misa Jorge MD, personally saw the patient today and spent less than or equal to 30 minutes discharging this patient.       Misa Jorge MD  [facility] [address]  [address] [address]  Phone: [phone]  Fax: [phone]  ______________________________________________________________________    Physical Exam   Vital Signs: Temp: 98.5  F (36.9  C) Temp src: Oral BP: 139/80 Pulse: 96   Resp: 18 SpO2: 97 % O2 Device: None (Room air)    Weight: 262 lbs 0 oz  Gen: NAD, lying comfortably in bed, pleasant and cooperative with interview and exam  HEENT: normocephalic, no scleral icterus, no perioral lesions, oral mucosa moist  CV: RRR at time of exam  Pulm: good air movement bilaterally without wheezing  Abd: soft, +bs, nontender to palpation diffusely, nondistended  LE: no edema bilaterally  Skin: no rash or jaundice on limited exam  Neuro: AOx3, no tremor, gait stable  Psych: mood-affect congruent       Primary Care Physician   Physician No Ref-Primary    Discharge Orders      Adult GI  Referral - Consult Only      Reason for your hospital stay    You were hospitalized with acute pancreatitis (inflammation and swelling of the pancreas).     Activity    Your activity upon discharge: activity as tolerated     Discharge Instructions    1) Please follow up as scheduled with your primary care doctor in about 2 weeks to let  them know you were in the hospital and have a check up with them.  2) You will be called to schedule a clinic appointment with the Gastroenterology (GI) clinic at [facility]/ to look more at other causes for pancreatitis since we did not see a cause for your pancreatitis this hospital stay.     Diet    Follow this diet upon discharge: Orders Placed This Encounter      Regular Diet Adult       Significant Results and Procedures   Most Recent 3 CBC's:  Recent Labs   Lab Test 09/16/23  0637 09/15/23  0655 09/14/23 2117   WBC 6.4 6.9 6.9   HGB 15.0 14.1 15.1   MCV 85 86 87    205 228     Most Recent 3 BMP's:  Recent Labs   Lab Test 09/16/23  0637 09/15/23  0655 09/14/23 2117    137 142   POTASSIUM 4.3 4.2 4.1   CHLORIDE 102 105 104   CO2 21* 25 23   BUN 10.6 9.0 13.1   CR 0.86 0.83 0.86   ANIONGAP 13 7 15   MELANIA 9.4 9.2 9.5   * 104* 99     Most Recent 2 LFT's:  Recent Labs   Lab Test 09/16/23  0637 09/15/23  0655   AST 17 14   ALT 22 22   ALKPHOS 69 59   BILITOTAL 0.4 0.3       Discharge Medications   Discharge Medication List as of 9/16/2023 10:29 AM        START taking these medications    Details   acetaminophen (TYLENOL) 325 MG tablet Take 3 tablets (975 mg) by mouth every 8 hours as needed for mild pain, Disp-27 tablet, R-0, E-Prescribe      ondansetron (ZOFRAN ODT) 4 MG ODT tab Take 1 tablet (4 mg) by mouth every 6 hours as needed for nausea or vomiting, Disp-8 tablet, R-0, E-Prescribe           Allergies   No Known Allergies

## 2023-09-16 NOTE — PLAN OF CARE
"  VS: /80 (BP Location: Right arm)   Pulse 96   Temp 98.5  F (36.9  C) (Oral)   Resp 18   Ht 2.032 m (6' 8\")   Wt 118.8 kg (262 lb)   SpO2 97%   BMI 28.78 kg/m         O2: 97% on RA. Denies SOB and chest pain.   Output: Voiding spontaneously without difficulty.   Last BM: 9/15 per pt report.   Activity: Independent.    Skin: Intact.   Pain: Denies.   CMS: A&Ox4. Able to make needs known. CMS intact.   Dressing: None.   Diet: Regular, tolerating well. Denies N/V.   LDA: None.   Equipment: Personal Belongings.   Plan: Discharge today. See below.   Additional Info:      Patient discharged from unit to home around 1200. Discharge paperwork discussed with patient. Patient acknowledged understanding and all questions answered. Pt left with all discharge medications and personal belongings.        "

## 2023-09-22 ENCOUNTER — TELEPHONE (OUTPATIENT)
Dept: GASTROENTEROLOGY | Facility: CLINIC | Age: 18
End: 2023-09-22
Payer: COMMERCIAL

## 2023-09-22 NOTE — TELEPHONE ENCOUNTER
[facility]     Referring provider: Misa Jorge MD  [facility]    Referred to: [facility]     Provider Requested: NA     Referral Received: 9/16/23     Records received: Epic     Images received: PACS    Insurance Coverage: [facility]    Evaluation for: K85.90 (ICD-10-CM) - Acute pancreatitis, unspecified complication status, unspecified pancreatitis typeAdditional Information:  16 y/o with hospitalization for acute pancreatitis of unclear cause, referral is for work up of congenital or autoimmune causes of acute pancreatitis     Clinical History (per RN review):   Recent admission on [facility] 9/14-9/16/23  #Acute Pancreatitis: Etiology uncertain  Presented with abdominal pain. Lipase of 1,449, consistent with acute pancreatitis. RUQ ultrasound normal. LFTs within normal limits. Differential etiologies includes EtOH (however, patient denies alcohol use), gallstones (however, alkaline phosphatase wnl and has a normal abdominal ultrasound), hypertriglyceridemia (triglycerides elevated, but not to the level that would account for pancreatitis), medication-induced (on no medications chronically), hypercalcemia (calcium within normal limits w/ normal albumin), anatomical (although visualized portion on ultrasound were normal), infections (no localizing symptoms of infection), toxins (no reported toxin exposure), and genetic risk/autoimmune cause. Pt treated with supportive cares with IVF, had PRN pain and antiemetic medications ordered (but he didn't use). Tolerating regular diet early, pain improved by day of discharge and tolerating PO intake and maintaining hydration without IVF. With uncertainty of cause for pancreatitis and young age of onset, recommended referral to GI clinic (panc/bili clinic) for further evaluation of congenital or autoimmune predisposition for acute pancreatitis and patient agreeable and referral placed. Pt already  has follow up with PCP in two weeks and I encouraged him to keep this appointment. Letter provided to patient for school.    Normal abdominal US is the only imaging performed.     Lipase   Date Value Ref Range Status   09/14/2023 1,449 (H) 13 - 60 U/L Final     Comment:     On 02/07/2023 the assay method at [facility] laboratory was changed to the Roche Lipase method on the Mel c6000. Results obtained with different assay methods or kits cannot be used interchangeably, and therefore, direct comparison to results obtained from this laboratory prior to 02/07/2023 should be interpreted with caution, with each result interpreted in the context of its own reference interval.     CMP Normal.    MD review date: 9/25/23 Dr. Trino BELLO Decision for clinic consultation/Orders:         Please schedule clinic visit with me        Referral updates/Patient contacted: Referral accepted 9/25/23

## 2023-09-27 ENCOUNTER — HOSPITAL ENCOUNTER (EMERGENCY)
Facility: CLINIC | Age: 18
Discharge: HOME | End: 2023-09-27
Attending: EMERGENCY MEDICINE | Admitting: EMERGENCY MEDICINE
Payer: COMMERCIAL

## 2023-09-27 VITALS
TEMPERATURE: 98.9 F | BODY MASS INDEX: 30.2 KG/M2 | WEIGHT: 261 LBS | DIASTOLIC BLOOD PRESSURE: 85 MMHG | RESPIRATION RATE: 16 BRPM | HEART RATE: 68 BPM | OXYGEN SATURATION: 98 % | HEIGHT: 78 IN | SYSTOLIC BLOOD PRESSURE: 121 MMHG

## 2023-09-27 DIAGNOSIS — M54.6 ACUTE BILATERAL THORACIC BACK PAIN: Primary | ICD-10-CM

## 2023-09-27 PROCEDURE — 250N000011 HC RX IP 250 OP 636: Mod: JZ | Performed by: EMERGENCY MEDICINE

## 2023-09-27 PROCEDURE — 96372 THER/PROPH/DIAG INJ SC/IM: CPT | Performed by: EMERGENCY MEDICINE

## 2023-09-27 PROCEDURE — 99284 EMERGENCY DEPT VISIT MOD MDM: CPT | Performed by: EMERGENCY MEDICINE

## 2023-09-27 PROCEDURE — 250N000013 HC RX MED GY IP 250 OP 250 PS 637: Performed by: EMERGENCY MEDICINE

## 2023-09-27 PROCEDURE — 99283 EMERGENCY DEPT VISIT LOW MDM: CPT | Mod: GC | Performed by: EMERGENCY MEDICINE

## 2023-09-27 RX ORDER — LIDOCAINE 50 MG/G
1 PATCH TOPICAL EVERY 24 HOURS
Qty: 10 PATCH | Refills: 0 | Status: SHIPPED | OUTPATIENT
Start: 2023-09-27 | End: 2023-09-27

## 2023-09-27 RX ORDER — METHOCARBAMOL 500 MG/1
500 TABLET, FILM COATED ORAL 4 TIMES DAILY PRN
Qty: 10 TABLET | Refills: 0 | Status: SHIPPED | OUTPATIENT
Start: 2023-09-27 | End: 2023-11-10

## 2023-09-27 RX ORDER — KETOROLAC TROMETHAMINE 15 MG/ML
15 INJECTION, SOLUTION INTRAMUSCULAR; INTRAVENOUS ONCE
Status: COMPLETED | OUTPATIENT
Start: 2023-09-27 | End: 2023-09-27

## 2023-09-27 RX ORDER — LIDOCAINE 50 MG/G
1 PATCH TOPICAL EVERY 24 HOURS
Qty: 10 PATCH | Refills: 0 | Status: SHIPPED | OUTPATIENT
Start: 2023-09-27 | End: 2023-11-10

## 2023-09-27 RX ORDER — ACETAMINOPHEN 500 MG
1000 TABLET ORAL ONCE
Status: COMPLETED | OUTPATIENT
Start: 2023-09-27 | End: 2023-09-27

## 2023-09-27 RX ORDER — LIDOCAINE 4 G/G
1 PATCH TOPICAL ONCE
Status: DISCONTINUED | OUTPATIENT
Start: 2023-09-27 | End: 2023-09-28 | Stop reason: HOSPADM

## 2023-09-27 RX ORDER — METHOCARBAMOL 500 MG/1
500 TABLET, FILM COATED ORAL 4 TIMES DAILY PRN
Qty: 10 TABLET | Refills: 0 | Status: SHIPPED | OUTPATIENT
Start: 2023-09-27 | End: 2023-09-27

## 2023-09-27 RX ORDER — METHOCARBAMOL 500 MG/1
500 TABLET, FILM COATED ORAL ONCE
Status: COMPLETED | OUTPATIENT
Start: 2023-09-27 | End: 2023-09-27

## 2023-09-27 RX ADMIN — METHOCARBAMOL 500 MG: 500 TABLET ORAL at 20:42

## 2023-09-27 RX ADMIN — ACETAMINOPHEN 1000 MG: 500 TABLET ORAL at 20:42

## 2023-09-27 RX ADMIN — LIDOCAINE 1 PATCH: 4 PATCH TOPICAL at 20:43

## 2023-09-27 RX ADMIN — KETOROLAC TROMETHAMINE 15 MG: 15 INJECTION, SOLUTION INTRAMUSCULAR; INTRAVENOUS at 20:17

## 2023-09-27 ASSESSMENT — ACTIVITIES OF DAILY LIVING (ADL): ADLS_ACUITY_SCORE: 35

## 2023-09-28 NOTE — ED TRIAGE NOTES
Patient reports straining their neck 4 day ago, pain is worse today then other days. Neck hurts when he looks down or turns his neck.     Triage Assessment       Row Name 09/27/23 1921       Triage Assessment (Adult)    Airway WDL WDL       Respiratory WDL    Respiratory WDL WDL       Skin Circulation/Temperature WDL    Skin Circulation/Temperature WDL WDL       Cardiac WDL    Cardiac WDL WDL       Peripheral/Neurovascular WDL    Peripheral Neurovascular WDL WDL       Cognitive/Neuro/Behavioral WDL    Cognitive/Neuro/Behavioral WDL WDL

## 2023-09-28 NOTE — ED PROVIDER NOTES
ED Provider Note  [facility]      History     Chief Complaint   Patient presents with    Neck Pain     Patient reports straining their neck 4 day ago, pain is worse today then other days. Neck hurts when he looks down or turns his neck.     HPI  Ade Rodarte is an otherwise healthy 17 year old male who presents to the ED with neck and back pain. The patient reports that on Sunday, 9/24, he was doing stretches of the spine and neck. She was stretching to the lateral left when he felt a odd click sensation in his upper thoracic spine. He reports this pain improved the day following but upon waking up yesterday the pain returned and began to progressively worsen. Describes the pain as 3/10 scale dull ache in the thoracic back worse with movement of the neck but there is no neck pain or stiffness. Denies fevers, chills, rashes, weakness, paresthesias, radiating pain down arm, bowel or bladder incontinence. No current headache. No history of back injuries, vitamin D deficiency, or weight bearing training. No chest pain or labored breathing.     Social history is pertinent for the following:   Enrolled at [employer].   No IVDU or shared needles    Past Medical History  History reviewed. No pertinent past medical history.  History reviewed. No pertinent surgical history.  lidocaine (LIDODERM) 5 % patch  methocarbamol (ROBAXIN) 500 MG tablet  acetaminophen (TYLENOL) 325 MG tablet  ondansetron (ZOFRAN ODT) 4 MG ODT tab      No Known Allergies  Family History  History reviewed. No pertinent family history.  Social History   Social History     Tobacco Use    Smoking status: Never    Smokeless tobacco: Never   Substance Use Topics    Alcohol use: Never    Drug use: Never         A medically appropriate review of systems was performed with pertinent positives and negatives noted in the HPI, and all other systems negative.    Physical Exam   BP: 134/84  Pulse: 80  Temp: 98.9  F  "(37.2  C)  Resp: 16  Height: 203.2 cm (6' 8\")  Weight: 118.4 kg (261 lb)  SpO2: 97 %  Physical Exam  General: Well appearing, well dressed, NAD  Head:  Scalp is atraumatic.  Eyes:  Normal conjunctiva.   ENT:                                            Neck:  Supple. No rigidity. Full ROM. No masses. No thyroid megaly.   CV:  Normal rate, regular rhythm. No murmur. Warm and well perfused extremities.  Resp:  Breath sounds are clear bilaterally. Non-labored, no retractions or accessory muscle use.  MS:  Normal range of motion. No back tenderness. No acute deformities. No midline spine tenderness. No spinal process step off. No paravertebral muscle tenderness. No shoulder or neck tenderness. Full ROM in the neck.  Skin:  Warm and dry. No rash.   Neuro:   Alert and oriented. Strength and sensation intact.   Psych: Awake. Alert.  Appropriate interactions.       ED Course, Procedures, & Data      Procedures                      No results found for any visits on 09/27/23.  Medications - No data to display  Labs Ordered and Resulted from Time of ED Arrival to Time of ED Departure - No data to display  No orders to display              Assessment & Plan    Otherwise healthy young male presents for back pain following doing some back stretches. No trauma or injury. No red flag symptoms to suggest cord injury. Here, the patient is afebrile, well appearing without midline spinal tenderness, erythema, rash. No trauma. He is neurologically intact with full ROM in the neck neck. Most likely symptoms are musculoskeletal in nature such as muscle spasm.     He was treated symptomatically. After an hour of receipt of Tylenol, Ibuprofen, Robaxin and Lidocaine patch, he feels significantly improved. Will offer a short course of Robaxin and Lidocaine patch prescription, instructions for return to clinic or ED guidelines. Patient has Tylenol and Ibuprofen at home, discussed dosing. He will follow up with his PCP scheduled on 09/29 Friday " so he can request refills at that time.     I have reviewed the nursing notes. I have reviewed the findings, diagnosis, plan and need for follow up with the patient.    New Prescriptions    No medications on file       Final diagnoses:   None     Bear Solis MD  [facility], PGY-1      Andrew Kelley  [facility] EMERGENCY DEPARTMENT  9/27/2023    --    ED Attending Physician Attestation    I Andrew Kelley MD, cared for this patient with the Resident. I have performed a history and physical examination of the patient and discussed management with the resident. I reviewed the resident's documentation above and agree with the documented findings and plan of care.    Summary of HPI, PE, ED Course   Patient is a 17 year old male evaluated in the emergency department for atraumatic back pain. Exam and ED course notable for no tenderness, step offs, deformities. Full ROM in the neck. No sign of cord injury After the completion of care in the emergency department, the patient was discharged.    Critical Care & Procedures  Not applicable.        Medical Decision Making  The patient's presentation was of low complexity (an acute and uncomplicated illness or injury).    The patient's evaluation involved:  history and exam without other MDM data elements    The patient's management necessitated moderate risk (prescription drug management including medications given in the ED).          Andrew Kelley MD  Emergency Medicine        Andrew Kelley MD  09/28/23 0012

## 2023-09-28 NOTE — DISCHARGE INSTRUCTIONS
You were seen in the ED for upper spine/back pain. Your exam was reassuring. Avoid strenuous activities that worsen your pain, and avoid heavy lifting while you heal. You can take take Tylenol and ibuprofen for pain. I am also sending a prescription for pain medicine to your pharmacy. Please follow up with your primary care provider.    For new or worsening pain or symptoms of fevers, rash weakness, numbness, bowel or bladder incontinence, or headache and confusion, please return to the emergency department.

## 2023-11-02 ENCOUNTER — HOSPITAL ENCOUNTER (OUTPATIENT)
Dept: RESEARCH | Facility: CLINIC | Age: 18
Discharge: HOME | End: 2023-11-02
Attending: PEDIATRICS | Admitting: PEDIATRICS
Payer: COMMERCIAL

## 2023-11-02 ENCOUNTER — LAB REQUISITION (OUTPATIENT)
Dept: LAB | Facility: CLINIC | Age: 18
End: 2023-11-02

## 2023-11-02 LAB
BASOPHILS # BLD AUTO: 0 10E3/UL (ref 0–0.2)
BASOPHILS NFR BLD AUTO: 0 %
CREAT SERPL-MCNC: 0.84 MG/DL (ref 0.67–1.17)
EGFRCR SERPLBLD CKD-EPI 2021: >90 ML/MIN/1.73M2
EOSINOPHIL # BLD AUTO: 0.1 10E3/UL (ref 0–0.7)
EOSINOPHIL NFR BLD AUTO: 2 %
ERYTHROCYTE [DISTWIDTH] IN BLOOD BY AUTOMATED COUNT: 13.3 % (ref 10–15)
FASTING STATUS PATIENT QL REPORTED: NORMAL
GLUCOSE SERPL-MCNC: 97 MG/DL (ref 70–99)
HBA1C MFR BLD: 5.8 %
HCT VFR BLD AUTO: 47.6 % (ref 40–53)
HGB BLD-MCNC: 15.7 G/DL (ref 13.3–17.7)
HOLD SPECIMEN: NORMAL
IMM GRANULOCYTES # BLD: 0 10E3/UL
IMM GRANULOCYTES NFR BLD: 0 %
LYMPHOCYTES # BLD AUTO: 1.9 10E3/UL (ref 0.8–5.3)
LYMPHOCYTES NFR BLD AUTO: 37 %
MCH RBC QN AUTO: 28.8 PG (ref 26.5–33)
MCHC RBC AUTO-ENTMCNC: 33 G/DL (ref 31.5–36.5)
MCV RBC AUTO: 87 FL (ref 78–100)
MONOCYTES # BLD AUTO: 0.3 10E3/UL (ref 0–1.3)
MONOCYTES NFR BLD AUTO: 6 %
NEUTROPHILS # BLD AUTO: 2.8 10E3/UL (ref 1.6–8.3)
NEUTROPHILS NFR BLD AUTO: 55 %
NRBC # BLD AUTO: 0 10E3/UL
NRBC BLD AUTO-RTO: 0 /100
PLATELET # BLD AUTO: 235 10E3/UL (ref 150–450)
RBC # BLD AUTO: 5.46 10E6/UL (ref 4.4–5.9)
WBC # BLD AUTO: 5.1 10E3/UL (ref 4–11)

## 2023-11-02 PROCEDURE — 510N000009 HC RESEARCH FACILITY, PER 15 MIN

## 2023-11-02 PROCEDURE — 82947 ASSAY GLUCOSE BLOOD QUANT: CPT | Performed by: PEDIATRICS

## 2023-11-02 PROCEDURE — 82565 ASSAY OF CREATININE: CPT | Performed by: PEDIATRICS

## 2023-11-02 PROCEDURE — 85025 COMPLETE CBC W/AUTO DIFF WBC: CPT | Performed by: PEDIATRICS

## 2023-11-02 PROCEDURE — 510N000016 HC RESEARCH MEALS, PER MEAL

## 2023-11-02 PROCEDURE — 83036 HEMOGLOBIN GLYCOSYLATED A1C: CPT | Performed by: PEDIATRICS

## 2023-11-02 PROCEDURE — 510N000017 HC CRU PATIENT CARE, PER 15 MIN

## 2023-11-03 NOTE — ADDENDUM NOTE
Encounter addended by: Ayanna Chamberlain RN on: 11/3/2023 6:43 AM   Actions taken: Charge Capture section accepted

## 2023-11-10 ENCOUNTER — APPOINTMENT (OUTPATIENT)
Dept: CT IMAGING | Facility: CLINIC | Age: 18
End: 2023-11-10
Attending: EMERGENCY MEDICINE
Payer: COMMERCIAL

## 2023-11-10 ENCOUNTER — HOSPITAL ENCOUNTER (INPATIENT)
Facility: CLINIC | Age: 18
LOS: 3 days | Discharge: HOME | End: 2023-11-13
Attending: EMERGENCY MEDICINE | Admitting: PSYCHOLOGIST
Payer: COMMERCIAL

## 2023-11-10 DIAGNOSIS — K85.00 IDIOPATHIC ACUTE PANCREATITIS, UNSPECIFIED COMPLICATION STATUS: ICD-10-CM

## 2023-11-10 LAB
ALBUMIN SERPL BCG-MCNC: 4.5 G/DL (ref 3.5–5.2)
ALBUMIN SERPL BCG-MCNC: 4.5 G/DL (ref 3.5–5.2)
ALP SERPL-CCNC: 61 U/L (ref 40–129)
ALP SERPL-CCNC: NORMAL U/L
ALT SERPL W P-5'-P-CCNC: 30 U/L (ref 0–50)
ALT SERPL W P-5'-P-CCNC: NORMAL U/L
ANION GAP SERPL CALCULATED.3IONS-SCNC: 11 MMOL/L (ref 7–15)
ANION GAP SERPL CALCULATED.3IONS-SCNC: 11 MMOL/L (ref 7–15)
AST SERPL W P-5'-P-CCNC: 18 U/L (ref 0–35)
AST SERPL W P-5'-P-CCNC: NORMAL U/L
BASOPHILS # BLD AUTO: 0 10E3/UL (ref 0–0.2)
BASOPHILS NFR BLD AUTO: 0 %
BILIRUB SERPL-MCNC: 0.2 MG/DL
BILIRUB SERPL-MCNC: 0.3 MG/DL
BUN SERPL-MCNC: 10.3 MG/DL (ref 6–20)
BUN SERPL-MCNC: 9.9 MG/DL (ref 6–20)
CALCIUM SERPL-MCNC: 9.4 MG/DL (ref 8.6–10)
CALCIUM SERPL-MCNC: 9.5 MG/DL (ref 8.6–10)
CHLORIDE SERPL-SCNC: 103 MMOL/L (ref 98–107)
CHLORIDE SERPL-SCNC: 103 MMOL/L (ref 98–107)
CREAT SERPL-MCNC: 0.73 MG/DL (ref 0.67–1.17)
CREAT SERPL-MCNC: 0.76 MG/DL (ref 0.67–1.17)
CRP SERPL-MCNC: <3 MG/L
DEPRECATED HCO3 PLAS-SCNC: 24 MMOL/L (ref 22–29)
DEPRECATED HCO3 PLAS-SCNC: 25 MMOL/L (ref 22–29)
EGFRCR SERPLBLD CKD-EPI 2021: >90 ML/MIN/1.73M2
EGFRCR SERPLBLD CKD-EPI 2021: >90 ML/MIN/1.73M2
EOSINOPHIL # BLD AUTO: 0.2 10E3/UL (ref 0–0.7)
EOSINOPHIL NFR BLD AUTO: 3 %
ERYTHROCYTE [DISTWIDTH] IN BLOOD BY AUTOMATED COUNT: 13.4 % (ref 10–15)
ERYTHROCYTE [SEDIMENTATION RATE] IN BLOOD BY WESTERGREN METHOD: 3 MM/HR (ref 0–15)
GLUCOSE SERPL-MCNC: 116 MG/DL (ref 70–99)
GLUCOSE SERPL-MCNC: 97 MG/DL (ref 70–99)
HCT VFR BLD AUTO: 45.4 % (ref 40–53)
HGB BLD-MCNC: 14.8 G/DL (ref 13.3–17.7)
IMM GRANULOCYTES # BLD: 0 10E3/UL
IMM GRANULOCYTES NFR BLD: 0 %
LIPASE SERPL-CCNC: 2314 U/L (ref 13–60)
LYMPHOCYTES # BLD AUTO: 2.5 10E3/UL (ref 0.8–5.3)
LYMPHOCYTES NFR BLD AUTO: 33 %
MCH RBC QN AUTO: 28.4 PG (ref 26.5–33)
MCHC RBC AUTO-ENTMCNC: 32.6 G/DL (ref 31.5–36.5)
MCV RBC AUTO: 87 FL (ref 78–100)
MONOCYTES # BLD AUTO: 0.4 10E3/UL (ref 0–1.3)
MONOCYTES NFR BLD AUTO: 5 %
NEUTROPHILS # BLD AUTO: 4.5 10E3/UL (ref 1.6–8.3)
NEUTROPHILS NFR BLD AUTO: 59 %
NRBC # BLD AUTO: 0 10E3/UL
NRBC BLD AUTO-RTO: 0 /100
PLATELET # BLD AUTO: 196 10E3/UL (ref 150–450)
POTASSIUM SERPL-SCNC: 3.9 MMOL/L (ref 3.4–5.3)
POTASSIUM SERPL-SCNC: 4.8 MMOL/L (ref 3.4–5.3)
PROT SERPL-MCNC: 7.4 G/DL (ref 6.4–8.3)
PROT SERPL-MCNC: 7.7 G/DL (ref 6.4–8.3)
RBC # BLD AUTO: 5.22 10E6/UL (ref 4.4–5.9)
SODIUM SERPL-SCNC: 138 MMOL/L (ref 135–145)
SODIUM SERPL-SCNC: 139 MMOL/L (ref 135–145)
TRIGL SERPL-MCNC: 100 MG/DL
WBC # BLD AUTO: 7.6 10E3/UL (ref 4–11)

## 2023-11-10 PROCEDURE — 85652 RBC SED RATE AUTOMATED: CPT | Performed by: EMERGENCY MEDICINE

## 2023-11-10 PROCEDURE — 80053 COMPREHEN METABOLIC PANEL: CPT | Performed by: EMERGENCY MEDICINE

## 2023-11-10 PROCEDURE — 84155 ASSAY OF PROTEIN SERUM: CPT | Performed by: EMERGENCY MEDICINE

## 2023-11-10 PROCEDURE — 83690 ASSAY OF LIPASE: CPT | Performed by: EMERGENCY MEDICINE

## 2023-11-10 PROCEDURE — 99223 1ST HOSP IP/OBS HIGH 75: CPT | Mod: AI

## 2023-11-10 PROCEDURE — 99285 EMERGENCY DEPT VISIT HI MDM: CPT | Mod: 25

## 2023-11-10 PROCEDURE — 250N000011 HC RX IP 250 OP 636: Performed by: EMERGENCY MEDICINE

## 2023-11-10 PROCEDURE — 120N000001 HC R&B MED SURG/OB

## 2023-11-10 PROCEDURE — 36415 COLL VENOUS BLD VENIPUNCTURE: CPT | Performed by: EMERGENCY MEDICINE

## 2023-11-10 PROCEDURE — 85025 COMPLETE CBC W/AUTO DIFF WBC: CPT | Performed by: EMERGENCY MEDICINE

## 2023-11-10 PROCEDURE — 84478 ASSAY OF TRIGLYCERIDES: CPT | Performed by: EMERGENCY MEDICINE

## 2023-11-10 PROCEDURE — 86140 C-REACTIVE PROTEIN: CPT | Performed by: EMERGENCY MEDICINE

## 2023-11-10 PROCEDURE — 74177 CT ABD & PELVIS W/CONTRAST: CPT

## 2023-11-10 PROCEDURE — 258N000003 HC RX IP 258 OP 636: Performed by: EMERGENCY MEDICINE

## 2023-11-10 PROCEDURE — 258N000003 HC RX IP 258 OP 636

## 2023-11-10 PROCEDURE — 250N000009 HC RX 250: Performed by: EMERGENCY MEDICINE

## 2023-11-10 RX ORDER — IOPAMIDOL 755 MG/ML
131 INJECTION, SOLUTION INTRAVASCULAR ONCE
Status: COMPLETED | OUTPATIENT
Start: 2023-11-10 | End: 2023-11-10

## 2023-11-10 RX ORDER — ONDANSETRON 2 MG/ML
4 INJECTION INTRAMUSCULAR; INTRAVENOUS EVERY 6 HOURS PRN
Status: DISCONTINUED | OUTPATIENT
Start: 2023-11-10 | End: 2023-11-13 | Stop reason: HOSPADM

## 2023-11-10 RX ORDER — ACETAMINOPHEN 325 MG/1
975 TABLET ORAL EVERY 8 HOURS
Status: DISCONTINUED | OUTPATIENT
Start: 2023-11-10 | End: 2023-11-10

## 2023-11-10 RX ORDER — NALOXONE HYDROCHLORIDE 0.4 MG/ML
0.4 INJECTION, SOLUTION INTRAMUSCULAR; INTRAVENOUS; SUBCUTANEOUS
Status: DISCONTINUED | OUTPATIENT
Start: 2023-11-10 | End: 2023-11-13 | Stop reason: HOSPADM

## 2023-11-10 RX ORDER — ONDANSETRON 4 MG/1
4 TABLET, ORALLY DISINTEGRATING ORAL EVERY 6 HOURS PRN
Status: DISCONTINUED | OUTPATIENT
Start: 2023-11-10 | End: 2023-11-13 | Stop reason: HOSPADM

## 2023-11-10 RX ORDER — NALOXONE HYDROCHLORIDE 0.4 MG/ML
0.2 INJECTION, SOLUTION INTRAMUSCULAR; INTRAVENOUS; SUBCUTANEOUS
Status: DISCONTINUED | OUTPATIENT
Start: 2023-11-10 | End: 2023-11-13 | Stop reason: HOSPADM

## 2023-11-10 RX ORDER — MULTIVITAMIN,THERAPEUTIC
1 TABLET ORAL DAILY
COMMUNITY

## 2023-11-10 RX ORDER — HYDROMORPHONE HYDROCHLORIDE 1 MG/ML
0.5 INJECTION, SOLUTION INTRAMUSCULAR; INTRAVENOUS; SUBCUTANEOUS ONCE
Status: DISCONTINUED | OUTPATIENT
Start: 2023-11-10 | End: 2023-11-10

## 2023-11-10 RX ORDER — DEXTROSE MONOHYDRATE, SODIUM CHLORIDE, AND POTASSIUM CHLORIDE 50; 1.49; 4.5 G/1000ML; G/1000ML; G/1000ML
INJECTION, SOLUTION INTRAVENOUS CONTINUOUS
Status: DISCONTINUED | OUTPATIENT
Start: 2023-11-10 | End: 2023-11-10

## 2023-11-10 RX ORDER — HYDROMORPHONE HYDROCHLORIDE 2 MG/1
2 TABLET ORAL EVERY 4 HOURS PRN
Status: DISCONTINUED | OUTPATIENT
Start: 2023-11-10 | End: 2023-11-13 | Stop reason: HOSPADM

## 2023-11-10 RX ORDER — SODIUM CHLORIDE 9 MG/ML
INJECTION, SOLUTION INTRAVENOUS CONTINUOUS
Status: DISCONTINUED | OUTPATIENT
Start: 2023-11-10 | End: 2023-11-11

## 2023-11-10 RX ADMIN — IOPAMIDOL 131 ML: 755 INJECTION, SOLUTION INTRAVENOUS at 19:42

## 2023-11-10 RX ADMIN — POTASSIUM CHLORIDE, DEXTROSE MONOHYDRATE AND SODIUM CHLORIDE: 150; 5; 450 INJECTION, SOLUTION INTRAVENOUS at 21:06

## 2023-11-10 RX ADMIN — SODIUM CHLORIDE 77 ML: 9 INJECTION, SOLUTION INTRAVENOUS at 19:43

## 2023-11-10 RX ADMIN — SODIUM CHLORIDE: 9 INJECTION, SOLUTION INTRAVENOUS at 23:31

## 2023-11-10 ASSESSMENT — ACTIVITIES OF DAILY LIVING (ADL)
ADLS_ACUITY_SCORE: 33
ADLS_ACUITY_SCORE: 35
ADLS_ACUITY_SCORE: 35

## 2023-11-10 NOTE — ED TRIAGE NOTES
Epigastric abdomen pain since yesterday, feels similar to previous symptoms when he had pancreatitis.      Triage Assessment (Adult)       Row Name 11/10/23 7672          Triage Assessment    Airway WDL WDL        Respiratory WDL    Respiratory WDL WDL        Skin Circulation/Temperature WDL    Skin Circulation/Temperature WDL WDL        Cardiac WDL    Cardiac WDL WDL        Peripheral/Neurovascular WDL    Peripheral Neurovascular WDL WDL        Cognitive/Neuro/Behavioral WDL    Cognitive/Neuro/Behavioral WDL WDL

## 2023-11-11 LAB
ALBUMIN SERPL BCG-MCNC: 4.2 G/DL (ref 3.5–5.2)
ALP SERPL-CCNC: 56 U/L (ref 40–129)
ALT SERPL W P-5'-P-CCNC: 26 U/L (ref 0–50)
ANION GAP SERPL CALCULATED.3IONS-SCNC: 9 MMOL/L (ref 7–15)
AST SERPL W P-5'-P-CCNC: 19 U/L (ref 0–35)
BILIRUB SERPL-MCNC: 0.4 MG/DL
BUN SERPL-MCNC: 7.6 MG/DL (ref 6–20)
CALCIUM SERPL-MCNC: 9.2 MG/DL (ref 8.6–10)
CHLORIDE SERPL-SCNC: 105 MMOL/L (ref 98–107)
CREAT SERPL-MCNC: 0.79 MG/DL (ref 0.67–1.17)
DEPRECATED HCO3 PLAS-SCNC: 25 MMOL/L (ref 22–29)
EGFRCR SERPLBLD CKD-EPI 2021: >90 ML/MIN/1.73M2
ERYTHROCYTE [DISTWIDTH] IN BLOOD BY AUTOMATED COUNT: 13.3 % (ref 10–15)
GLUCOSE SERPL-MCNC: 104 MG/DL (ref 70–99)
HCT VFR BLD AUTO: 42.5 % (ref 40–53)
HGB BLD-MCNC: 13.8 G/DL (ref 13.3–17.7)
LIPASE SERPL-CCNC: 2491 U/L (ref 13–60)
MCH RBC QN AUTO: 28.3 PG (ref 26.5–33)
MCHC RBC AUTO-ENTMCNC: 32.5 G/DL (ref 31.5–36.5)
MCV RBC AUTO: 87 FL (ref 78–100)
PLATELET # BLD AUTO: 222 10E3/UL (ref 150–450)
POTASSIUM SERPL-SCNC: 4.4 MMOL/L (ref 3.4–5.3)
PROT SERPL-MCNC: 6.8 G/DL (ref 6.4–8.3)
RBC # BLD AUTO: 4.88 10E6/UL (ref 4.4–5.9)
SODIUM SERPL-SCNC: 139 MMOL/L (ref 135–145)
WBC # BLD AUTO: 5.9 10E3/UL (ref 4–11)

## 2023-11-11 PROCEDURE — 80053 COMPREHEN METABOLIC PANEL: CPT

## 2023-11-11 PROCEDURE — 258N000003 HC RX IP 258 OP 636

## 2023-11-11 PROCEDURE — 120N000001 HC R&B MED SURG/OB

## 2023-11-11 PROCEDURE — 83690 ASSAY OF LIPASE: CPT

## 2023-11-11 PROCEDURE — 250N000011 HC RX IP 250 OP 636: Mod: JZ | Performed by: INTERNAL MEDICINE

## 2023-11-11 PROCEDURE — 36415 COLL VENOUS BLD VENIPUNCTURE: CPT

## 2023-11-11 PROCEDURE — 85027 COMPLETE CBC AUTOMATED: CPT

## 2023-11-11 PROCEDURE — 99233 SBSQ HOSP IP/OBS HIGH 50: CPT | Performed by: INTERNAL MEDICINE

## 2023-11-11 RX ORDER — ENOXAPARIN SODIUM 100 MG/ML
40 INJECTION SUBCUTANEOUS EVERY 24 HOURS
Status: DISCONTINUED | OUTPATIENT
Start: 2023-11-11 | End: 2023-11-13 | Stop reason: HOSPADM

## 2023-11-11 RX ADMIN — SODIUM CHLORIDE: 9 INJECTION, SOLUTION INTRAVENOUS at 19:22

## 2023-11-11 RX ADMIN — SODIUM CHLORIDE: 9 INJECTION, SOLUTION INTRAVENOUS at 12:46

## 2023-11-11 RX ADMIN — ENOXAPARIN SODIUM 40 MG: 40 INJECTION SUBCUTANEOUS at 11:36

## 2023-11-11 RX ADMIN — SODIUM CHLORIDE: 9 INJECTION, SOLUTION INTRAVENOUS at 06:11

## 2023-11-11 ASSESSMENT — ACTIVITIES OF DAILY LIVING (ADL)
ADLS_ACUITY_SCORE: 35
ADLS_ACUITY_SCORE: 18
ADLS_ACUITY_SCORE: 35
ADLS_ACUITY_SCORE: 18
ADLS_ACUITY_SCORE: 35
ADLS_ACUITY_SCORE: 35
ADLS_ACUITY_SCORE: 18

## 2023-11-11 NOTE — ED NOTES
"[facility]  ED Nurse Handoff Report    ED Chief complaint: Abdominal Pain      ED Diagnosis:   Final diagnoses:   Idiopathic acute pancreatitis, unspecified complication status       Code Status: Full Code    Allergies: No Known Allergies    Patient Story: Ade Rodarte is a 17 year old male who presents with 3 days of recurrent intermittent epigastric pain.  Happened before and was diagnosed with pancreatitis and admitted at [place].  Says they don't know why.  Ultrasound had no gallstones.  No fevers or vomiting.  Symptoms started after eating pizza this time.  Overall states symptoms seem mild at this moment.   Focused Assessment:  Cardiac-WDL  Neuro-WDL  Resp-WDL    Treatments and/or interventions provided: IV NS D5 20 K  Patient's response to treatments and/or interventions: no change    To be done/followed up on inpatient unit:  monitor    Does this patient have any cognitive concerns?:  A/OX4    Activity level - Baseline/Home:  Independent  Activity Level - Current:   Independent    Patient's Preferred language: English   Needed?: No    Isolation: None  Infection: Not Applicable  Patient tested for COVID 19 prior to admission: NO  Bariatric?: No    Vital Signs:   Vitals:    11/10/23 1758   BP: (!) 151/97   Pulse: 69   Resp: 14   Temp: 97.9  F (36.6  C)   TempSrc: Temporal   SpO2: 98%   Weight: 117.9 kg (260 lb)   Height: 2.032 m (6' 8\")       Cardiac Rhythm:     Was the PSS-3 completed:   Yes  What interventions are required if any?               Family Comments: none  OBS brochure/video discussed/provided to patient/family: N/A              Name of person given brochure if not patient: NA              Relationship to patient: NA    For the majority of the shift this patient's behavior was Green.   Behavioral interventions performed were NA.    ED NURSE PHONE NUMBER: [phone]        "

## 2023-11-11 NOTE — PROGRESS NOTES
[facility]    Medicine Progress Note - Hospitalist Service    Date of Admission:  11/10/2023    Assessment & Plan   Ade Rodarte is a 17 year old male with past medical history of idiopathic pancreatitis admitted on 11/10/2023 for epigastric pain.      Patient presented to the ED hemodynamically stable, although hypertensive with a BP of 151/97. He states he was eating pizza for dinner 11/08 with friends when shortly following he developed epigastric pain that he describes as a constant, dull ache with intermittent sharp stabbing pains. He states he's experienced this previously in September when he had idiopathic pancreatitis (see below). He endorses two soft but still formed bowel movements and intermittent nausea with his symptoms, but otherwise denies recent history of fever, rash, vomiting, diarrhea, hematochezia or melena. He denies recent alcohol or drug use. No recent travel or illnesses although, upon chart review, patient travelled to [facility] in 2022.     Of note, patient was recently hospitalized from 09/14-09/16 at [facility] for similar symptoms. He was noted at that time to have a lipase of 1,449. Per chart review, RUQ ultrasound normal. LFTs within normal limits. Differential etiologies at the time included EtOH (however, patient denies alcohol use), gallstones (however, alkaline phosphatase wnl and has a normal abdominal ultrasound), hypertriglyceridemia (triglycerides elevated, but not to the level that would account for pancreatitis), medication-induced (on no medications chronically), hypercalcemia (calcium within normal limits w/ normal albumin), anatomical (although visualized portion on ultrasound were normal), infections (no localizing symptoms of infection), toxins (no reported toxin exposure), and genetic risk/autoimmune cause. Pt treated with supportive cares with IVF, had PRN pain and antiemetic medications ordered (but he didn't use). Recommended GI follow-up, which  "the patient has scheduled but couldn't see until January 2024.      Idiopathic Acute Pancreatitis   Acute Epigastric Pain     On admission - elevated lipase; CT with evidence of acute interstitial pancreatitis  He is clinically improving somewhat with NPO status and supportive cares  I spoke with [facility] on-call this am who would like [facility] to see today as he follows with them and was recently admitted there    For now - continue IVF, NPO status and supportive meds for pain and nausea    Continue to monitor strict I/O's    Awaiting am lipase this am    Addendum - 1015 - lipase slightly more increased this am; LFT's and CBC within normal limits    Awaiting further recs from GI     Medical Decision Making       51 MINUTES SPENT BY ME on the date of service doing chart review, history, exam, documentation & further activities per the note.        PPE Worn:  Mask, gloves     Diet: NPO for Medical/Clinical Reasons Except for: Ice Chips    DVT Prophylaxis: Enoxaparin (Lovenox) SQ  Martinez Catheter: Not present  Lines: None     Cardiac Monitoring: None  Code Status: Full Code      Clinically Significant Risk Factors Present on Admission                                  Disposition Plan      Expected Discharge Date: 11/12/2023                  Jes Hannah DO  Hospitalist Service  [facility]  Securely message with [employer] (more info)  Text page via [employer] Paging/Directory   ______________________________________________________________________    Interval History   Slept \"on and off\".  Epigastric pain and nausea a little better this am while NPO. Urinating ok; no emesis or stools since admission.  No F/C, HA or CP.     Physical Exam   Vital Signs: Temp: 97.6  F (36.4  C) Temp src: Oral BP: 106/60 Pulse: 76   Resp: 14 SpO2: 98 % O2 Device: None (Room air)    Weight: 260 lbs 11.2 oz    GEN:  Alert, oriented x 3, appears fairly comfortable at rest, no overt distress.  HEENT:  " Normocephalic/atraumatic, no scleral icterus, no nasal discharge  CV:  Regular rate and rhythm, no loud murmur or JVD.  S1 + S2 noted  LUNGS:  Clear to auscultation ant/lat bilaterally without rales/rhonchi/wheezing/retractions.  Symmetric chest rise on inhalation noted.  ABD:  Active bowel sounds, soft, mild reproducible tenderness in the epigastric area.  No tenderness in the RUQ/LUQ or lower quadrants bilaterally.  Very mildly distended throughout.  No clear rebound/guarding/rigidity.  EXT:  No pretibial edema bilaterally.  No cyanosis.  No acute joint synovitis noted.  SKIN:  Dry to touch, no exanthems noted in the visualized areas.  PSYCH:  mood appropriate, not tearful or agitated    Medications    sodium chloride 150 mL/hr at 11/11/23 0611         Data     Labs and Imaging results below reviewed today.  Recent Labs   Lab 11/11/23  0800 11/10/23  1803   WBC 5.9 7.6   HGB 13.8 14.8   HCT 42.5 45.4   MCV 87 87    196     Recent Labs   Lab 11/11/23  0800 11/10/23  1855 11/10/23  1803    139 138   POTASSIUM 4.4 3.9 4.8   CHLORIDE 105 103 103   CO2 25 25 24   ANIONGAP 9 11 11   * 116* 97   BUN 7.6 9.9 10.3   CR 0.79 0.76 0.73   GFRESTIMATED >90 >90 >90   MELANIA 9.2 9.4 9.5     Recent Labs   Lab 11/11/23  0800 11/10/23  1855 11/10/23  1803    139 138   POTASSIUM 4.4 3.9 4.8   CHLORIDE 105 103 103   CO2 25 25 24   ANIONGAP 9 11 11   * 116* 97   BUN 7.6 9.9 10.3   CR 0.79 0.76 0.73   GFRESTIMATED >90 >90 >90   MELANIA 9.2 9.4 9.5   PROTTOTAL 6.8 7.4 7.7   ALBUMIN 4.2 4.5 4.5   BILITOTAL 0.4 0.3 0.2   ALKPHOS 56 61  --    AST 19 18  --    ALT 26 30  --      Recent Labs   Lab 11/11/23  0800 11/10/23  1803   LIPASE 2,491* 2,314*       Recent Results (from the past 24 hour(s))   CT Abdomen Pelvis w Contrast    Narrative    EXAM: CT ABDOMEN PELVIS W CONTRAST  LOCATION: [facility]  DATE: 11/10/2023    INDICATION: Pancreatitis.  Epigastric abdominal pain.  Evaluate for  mass.  Abscess.  Phlegmon.  Pseudocyst.  COMPARISON: None.  TECHNIQUE: CT scan of the abdomen and pelvis was performed following injection of IV contrast. Multiplanar reformats were obtained. Dose reduction techniques were used.  CONTRAST: 131 mL Isovue 370    FINDINGS:   LOWER CHEST: Normal.    HEPATOBILIARY: Normal.    PANCREAS: There is mild ill definition of the pancreatic margins with minimal surrounding soft tissue haziness compatible with acute interstitial pancreatitis. Mild prominence of the pancreatic duct. No fluid collections    SPLEEN: Normal.    ADRENAL GLANDS: Normal.    KIDNEYS/BLADDER: Normal.    BOWEL: Normal.    LYMPH NODES: Normal.    VASCULATURE: Unremarkable.    PELVIC ORGANS: Normal.    MUSCULOSKELETAL: Normal.      Impression    IMPRESSION:   1.  Mild changes of acute interstitial pancreatitis.

## 2023-11-11 NOTE — UTILIZATION REVIEW
Admission Status; Secondary Review Determination       Under the authority of the Utilization Management Committee, the utilization review process indicated a secondary review on the above patient. The review outcome is based on review of the medical records, discussions with staff, and applying clinical experience noted on the date of the review.     (x) Inpatient Status Appropriate - This patient's medical care is consistent with medical management for inpatient care and reasonable inpatient medical practice.     RATIONALE FOR DETERMINATION      17-year-old male with a history of idiopathic pancreatitis is admitted due to acute epigastric pain, similar to a previous episode. He is hypertensive with elevated lipase (2,314) and CT findings suggestive of acute interstitial pancreatitis. Inpatient care is necessary over short stay observation due to the high lipase levels indicating significant pancreatic inflammation, the need for continuous IV fluids, pain management, and close monitoring for potential complications given the severity and recurrence of his pancreatitis.  The expected length of stay at the time of admission was more than 2 nights because of the severity of illness, intensity of service provided, and risk for adverse outcome. Inpatient admission is appropriate.         This document was produced using voice recognition software       The information on this document is developed by the utilization review team in order for the business office to ensure compliance. This only denotes the appropriateness of proper admission status and does not reflect the quality of care rendered.   The definitions of Inpatient Status and Observation Status used in making the determination above are those provided in the CMS Coverage Manual, Chapter 1 and Chapter 6, section 70.4.   Sincerely,   RAMSES NORIEGA MD   System Medical Director   Utilization Management   [facility].

## 2023-11-11 NOTE — PLAN OF CARE
Orientation: Ao x4.   Activity: Independent   Diet/BS Checks: NPO   Tele:  N/a   IV Access/Drains: RPIV infusing NS @ 150 ml/hr.   Pain Management: Rates pain 1/10. No PRN needed this shift. Denies nausea.   Abnormal VS/Results: VSS on RA.   Bowel/Bladder: Continent. Has not [passed gas. No BM this shift.   Skin/Wounds: WDL   Consults: GI, SW   D/C Disposition: Pending clinical improvement.  Other Info:

## 2023-11-11 NOTE — ED PROVIDER NOTES
History     Chief Complaint:  Abdominal Pain       HPI   Ade Rodarte is a 17 year old male who presents to the ED with new onset epigastric abdominal pain which started 2 evenings ago after eating dinner.  The pain continues in a low-grade fashion and then increases in intensity periodically and is relatively focally located in the area of the epigastric location.  This is a similar presentation to what he had in September when he was admitted for 2 days from September 14 through 16 at the [facility].  The exact etiology of the pancreatitis was not clear at that time.  Please see the summary below which is an excerpt from the discharge summary.  There was some suggestion about the possibility of an autoimmune or congenital abnormality potentially causing this given the lack of other etiologies found during the last admission.  The patient was asked to follow-up with the specialty clinic (pancreaticobiliary).    The patient has had some mild nausea.  No vomiting.  No lower abdominal pain.  No back pain or shoulder pain.    Independent Historian:   None    Review of External Notes: [facility] admission in September 2023  Acute Pancreatitis: Etiology uncertain  Presented with abdominal pain. Lipase of 1,449, consistent with acute pancreatitis. RUQ ultrasound normal. LFTs within normal limits. Differential etiologies includes EtOH (however, patient denies alcohol use), gallstones (however, alkaline phosphatase wnl and has a normal abdominal ultrasound), hypertriglyceridemia (triglycerides elevated, but not to the level that would account for pancreatitis), medication-induced (on no medications chronically), hypercalcemia (calcium within normal limits w/ normal albumin), anatomical (although visualized portion on ultrasound were normal), infections (no localizing symptoms of infection), toxins (no reported toxin exposure), and genetic risk/autoimmune cause. Pt treated with supportive cares with  "IVF, had PRN pain and antiemetic medications ordered (but he didn't use). Tolerating regular diet early, pain improved by day of discharge and tolerating PO intake and maintaining hydration without IVF. With uncertainty of cause for pancreatitis and young age of onset, recommended referral to GI clinic (panc/bili clinic) for further evaluation of congenital or autoimmune predisposition for acute pancreatitis and patient agreeable and referral placed. Pt already has follow up with PCP in two weeks and I encouraged him to keep this appointment. Letter provided to patient for school.     Upon review of the electronic medical record, I do not see any CT imaging or MRI done during the last hospital stay.      Allergies:  No Known Allergies     Medications:    acetaminophen (TYLENOL) 325 MG tablet  lidocaine (LIDODERM) 5 % patch  methocarbamol (ROBAXIN) 500 MG tablet  ondansetron (ZOFRAN ODT) 4 MG ODT tab        Past Medical and Surgical History:    No past medical history on file.  No past surgical history on file.     Family History:    family history is not on file.    Social History:   reports that he has never smoked. He has never used smokeless tobacco. He reports that he does not drink alcohol and does not use drugs.    PCP: No Ref-Primary, Physician     Physical Exam   Patient Vitals for the past 24 hrs:   BP Temp Temp src Pulse Resp SpO2 Height Weight   11/10/23 1758 (!) 151/97 97.9  F (36.6  C) Temporal 69 14 98 % 2.032 m (6' 8\") 117.9 kg (260 lb)        General: Awake, resting on the ED stretcher.    Head:  The scalp, face, and head appear normal  Eyes:  The pupils are equal, round, and reactive to light    There is no nystagmus    Extraocular muscles are intact    Conjunctivae and sclerae are normal  ENT:    The nose is normal    Pinnae are normal    The oropharynx is normal  Neck:  Normal range of motion    There is no rigidity noted  CV:  Regular rate  Normal underlying rhythm     Normal S1/S2    No " pathological murmur detected  Resp:  Lungs are clear    There is no tachypnea    Non-labored    No rales    No wheezing   GI:  Abdomen is soft, there is no rigidity    No distension/tympani    No rebound tenderness     Non-surgical without peritoneal features at this time  MS:  Normal muscular tone    Symmetric motor strength    No major joint effusions  Skin:  No rash or acute skin lesions noted  Neuro:  Normal and fluent speech    No motor deficits  Psych: Awake. Alert.  Normal affect.      Emergency Department Course   EKG:  No results found for this or any previous visit.     Imaging:  CT Abdomen Pelvis w Contrast   Final Result   IMPRESSION:    1.  Mild changes of acute interstitial pancreatitis.       PANCREAS: There is mild ill definition of the pancreatic margins with minimal surrounding soft tissue haziness compatible with acute interstitial pancreatitis. Mild prominence of the pancreatic duct. No fluid collections       Report per radiology    Laboratory:  Labs Ordered and Resulted from Time of ED Arrival to Time of ED Departure   LIPASE - Abnormal       Result Value    Lipase 2,314 (*)    COMPREHENSIVE METABOLIC PANEL    Sodium 138      Potassium 4.8      Carbon Dioxide (CO2) 24      Anion Gap 11      Urea Nitrogen 10.3      Creatinine 0.73      GFR Estimate >90      Calcium 9.5      Chloride 103      Glucose 97      Alkaline Phosphatase        AST        ALT        Protein Total 7.7      Albumin 4.5      Bilirubin Total 0.2     CBC WITH PLATELETS AND DIFFERENTIAL    WBC Count 7.6      RBC Count 5.22      Hemoglobin 14.8      Hematocrit 45.4      MCV 87      MCH 28.4      MCHC 32.6      RDW 13.4      Platelet Count 196      % Neutrophils 59      % Lymphocytes 33      % Monocytes 5      % Eosinophils 3      % Basophils 0      % Immature Granulocytes 0      NRBCs per 100 WBC 0      Absolute Neutrophils 4.5      Absolute Lymphocytes 2.5      Absolute Monocytes 0.4      Absolute Eosinophils 0.2      Absolute  Basophils 0.0      Absolute Immature Granulocytes 0.0      Absolute NRBCs 0.0     COMPREHENSIVE METABOLIC PANEL   TRIGLYCERIDES        Procedures:  Procedures       Emergency Department Course & Assessments:             Interventions/ED Medications:  Medications   dextrose 5% and 0.45% NaCl + KCl 20 mEq/L infusion (has no administration in time range)          Assessments/Consultations/Discussion of Management:     I discussed the case with Susy Ramirez PA-C who is working with Dr. Malena Fox from the hospitalist service.      Disposition:    Admit to the hospital  Impression & Plan        Medical Decision Making:    This patient presents to the emergency department with epigastric abdominal pain for 2 days, reminiscent of the abdominal pain that went along with an episode of pancreatitis back in September.  The pancreatitis at that time was idiopathic in nature, no etiology was found.  During that admission the patient had laboratory studies and an ultrasound, he did not undergo MRI, MRCP, CT scan, or ERCP during that admission.  Today the patient has mild epigastric discomfort and a lipase just over 2000.  CT scan shows mild interstitial pancreatitis.  There is no abscess, pseudocyst, or definitive phlegmon, and there are no other etiologies for abdominal pain on the scan.  The patient will be placed on IV fluid, he will be made n.p.o., and GI consultation is indicated.  I suspect the patient will require 2 overnights in the hospital in order to resolve this episode of pancreatitis.  Patient will be placed on inpatient status at this time.  From a pain and nausea perspective, the patient is doing quite well.    Diagnosis:    ICD-10-CM    1. Idiopathic acute pancreatitis, unspecified complication status  K85.00                   Andrew Britton MD  11/10/2023   Andrew Britton MD Rock, Michael P, MD  11/10/23 2140

## 2023-11-11 NOTE — H&P
[facility]    History and Physical - Hospitalist Service       Date of Admission:  11/10/2023    Assessment & Plan      Ade Rodarte is a 17 year old male with past medical history of idiopathic pancreatitis admitted on 11/10/2023 for epigastric pain.     Patient presented to the ED hemodynamically stable, although hypertensive with a BP of 151/97. He states he was eating pizza for dinner 11/08 with friends when shortly following he developed epigastric pain that he describes as a constant, dull ache with intermittent sharp stabbing pains. He states he's experienced this previously in September when he had idiopathic pancreatitis (see below). He endorses two soft but still formed bowel movements and intermittent nausea with his symptoms, but otherwise denies recent history of fever, rash, vomiting, diarrhea, hematochezia or melena. He denies recent alcohol or drug use. No recent travel or illnesses although, upon chart review, patient travelled to [facility] in 2022. Father is at bedside who denies a family history of known autoimmune disorders. Labs notable for a lipase of 2,314. CBC and CMP otherwise unremarkable. CRP and ESR WNL. CT abdomen and pelvis demonstrated a mild ill definition of the pancreatic margins with minimal surrounding soft tissue haziness compatible with acute interstitial pancreatitis. Mild prominence of the pancreatic duct noted without fluid collections. There is no abscess, pseudocyst, or definitive phlegmon, and there are no other etiologies for abdominal pain on the scan. GI consulted.     Of note, patient was recently hospitalized from 09/14-09/16 at [facility] for similar symptoms. He was noted at that time to have a lipase of 1,449. Per chart review, RUQ ultrasound normal. LFTs within normal limits. Differential etiologies at the time included EtOH (however, patient denies alcohol use), gallstones (however, alkaline phosphatase wnl and has a normal abdominal  ultrasound), hypertriglyceridemia (triglycerides elevated, but not to the level that would account for pancreatitis), medication-induced (on no medications chronically), hypercalcemia (calcium within normal limits w/ normal albumin), anatomical (although visualized portion on ultrasound were normal), infections (no localizing symptoms of infection), toxins (no reported toxin exposure), and genetic risk/autoimmune cause. Pt treated with supportive cares with IVF, had PRN pain and antiemetic medications ordered (but he didn't use). Recommended GI follow-up, which the patient has scheduled but couldn't see until January 2024.     Idiopathic Acute Pancreatitis   Acute Epigastric Pain   - Admit to inpatient.   - NPO.   - Gastroenterology consulted, appreciate the cares.   - IV fluids 150 mL/hr.   - Pain management:    -- Dilaudid 1 mg-2mg q 4 hrs PRN based on severity.   - Zofran as needed for nausea.   - Lipase ordered for the morning.    - CBC and CMP ordered for the morning.            Diet: NPO per Anesthesia Guidelines for Procedure/Surgery Except for: Meds, Ice Chips    DVT Prophylaxis: Pneumatic Compression Devices  Martinez Catheter: Not present  Lines: None     Cardiac Monitoring: None  Code Status: Full Code      Disposition Plan      Expected Discharge Date: 11/12/2023                The patient's care was discussed with the Attending Physician, Dr. Malena Fox .    Roxana Ramirez PA-C  Hospitalist Service  [facility]  Securely message with Ansible (more info)  Text page via [facility] Paging/Directory     ______________________________________________________________________    Chief Complaint   Epigastric Pain     History is obtained from chart review, the patient and patient's father.     History of Present Illness   Ade Rodarte is a 17 year old male with past medical history of idiopathic pancreatitis admitted on 11/10/2023 for epigastric pain.     Patient presented to the  ED hemodynamically stable, although hypertensive with a BP of 151/97. He states he was eating pizza for dinner 11/08 with friends when shortly following he developed epigastric pain that he describes as a constant, dull ache with intermittent sharp stabbing pains. He states he's experienced this previously in September when he had idiopathic pancreatitis (see below). He endorses two soft but still formed bowel movements and intermittent nausea with his symptoms, but otherwise denies recent history of fever, rash, vomiting, diarrhea, hematochezia or melena. He denies recent alcohol or drug use. No recent travel or illnesses although, upon chart review, patient travelled to [facility] in 2022. Father is at bedside who denies a family history of known autoimmune disorders. Labs notable for a lipase of 2,314. CBC and CMP otherwise unremarkable. CRP and ESR WNL. CT abdomen and pelvis demonstrated a mild ill definition of the pancreatic margins with minimal surrounding soft tissue haziness compatible with acute interstitial pancreatitis. Mild prominence of the pancreatic duct noted without fluid collections. There is no abscess, pseudocyst, or definitive phlegmon, and there are no other etiologies for abdominal pain on the scan. GI consulted.     Of note, patient was recently hospitalized from 09/14-09/16 at [facility] for similar symptoms. He was noted at that time to have a lipase of 1,449. Per chart review, RUQ ultrasound normal. LFTs within normal limits. Differential etiologies at the time included EtOH (however, patient denies alcohol use), gallstones (however, alkaline phosphatase wnl and has a normal abdominal ultrasound), hypertriglyceridemia (triglycerides elevated, but not to the level that would account for pancreatitis), medication-induced (on no medications chronically), hypercalcemia (calcium within normal limits w/ normal albumin), anatomical (although visualized portion on ultrasound were normal), infections (no  localizing symptoms of infection), toxins (no reported toxin exposure), and genetic risk/autoimmune cause. Pt treated with supportive cares with IVF, had PRN pain and antiemetic medications ordered (but he didn't use). Recommended GI follow-up, which the patient has scheduled but couldn't see until January 2024.     Past Medical History    No past medical history on file.    Past Surgical History   No past surgical history on file.    Prior to Admission Medications   Prior to Admission Medications   Prescriptions Last Dose Informant Patient Reported? Taking?   multivitamin, therapeutic (THERA-VIT) TABS tablet   Yes Yes   Sig: Take 1 tablet by mouth daily      Facility-Administered Medications: None      Allergies   No Known Allergies     Physical Exam   Vital Signs: Temp: 98.7  F (37.1  C) Temp src: Oral BP: 139/79 Pulse: 78   Resp: 15 SpO2: 98 % O2 Device: None (Room air)    Weight: 260 lbs 0 oz  GENERAL:  Pleasant, cooperative, alert. Well developed, well nourished. Sitting in bed comfortably upon examination. Patient's dad at bedside.   HEENT: Normocephalic, atraumatic.  Extra occular mm intact.  Sclera clear. Mucous membranes moist.  No erythema; uvula midline.  Neck supple with no adenopathy.   PULMONOLOGY: Clear to auscultation bilaterally with good exchange at the bases and no wheeze or crackle.  CARDIAC: Regular rate and rhythm.  No appreciated murmur.   ABDOMEN: Soft, non distended. Mild tenderness to palpation of epigastric region.  MUSCULOSKELETAL:  Moving x 4 spontaneously.  Normal bulk and tone.  No LE edema.  Radial pulses 2+ bilaterally.    NEURO: Alert and oriented x3. Nonfocal exam.     Medical Decision Making       60 MINUTES SPENT BY ME on the date of service doing chart review, history, exam, documentation & further activities per the note.      Data     I have personally reviewed the following data over the past 24 hrs:    7.6  \   14.8   / 196     139 103 9.9 /  116 (H)   3.9 25 0.76 \     ALT:  30 AST: 18 AP: 61 TBILI: 0.3   ALB: 4.5 TOT PROTEIN: 7.4 LIPASE: 2,314 (H)     Procal: N/A CRP: <3.00 Lactic Acid: N/A         Imaging results reviewed over the past 24 hrs:   Recent Results (from the past 24 hour(s))   CT Abdomen Pelvis w Contrast    Narrative    EXAM: CT ABDOMEN PELVIS W CONTRAST  LOCATION: [facility]  DATE: 11/10/2023    INDICATION: Pancreatitis.  Epigastric abdominal pain.  Evaluate for mass.  Abscess.  Phlegmon.  Pseudocyst.  COMPARISON: None.  TECHNIQUE: CT scan of the abdomen and pelvis was performed following injection of IV contrast. Multiplanar reformats were obtained. Dose reduction techniques were used.  CONTRAST: 131 mL Isovue 370    FINDINGS:   LOWER CHEST: Normal.    HEPATOBILIARY: Normal.    PANCREAS: There is mild ill definition of the pancreatic margins with minimal surrounding soft tissue haziness compatible with acute interstitial pancreatitis. Mild prominence of the pancreatic duct. No fluid collections    SPLEEN: Normal.    ADRENAL GLANDS: Normal.    KIDNEYS/BLADDER: Normal.    BOWEL: Normal.    LYMPH NODES: Normal.    VASCULATURE: Unremarkable.    PELVIC ORGANS: Normal.    MUSCULOSKELETAL: Normal.      Impression    IMPRESSION:   1.  Mild changes of acute interstitial pancreatitis.

## 2023-11-11 NOTE — PROGRESS NOTES
RECEIVING UNIT ED HANDOFF REVIEW    ED Nurse Handoff Report was reviewed by: Gene Hameed RN on November 10, 2023 at 9:24 PM

## 2023-11-11 NOTE — ED NOTES
Rapid Assessment Note    History:   Ade Rodarte is a 17 year old male who presents with 3 days of recurrent intermittent epigastric pain.  Happened before and was diagnosed with pancreatitis and admitted at [facility].  Says they don't know why.  Ultrasound had no gallstones.  No fevers or vomiting.  Symptoms started after eating pizza this time.  Overall states symptoms seem mild at this moment.    Exam:   General:  Alert, interactive  Cardiovascular:  Well perfused  Lungs:  No respiratory distress, no accessory muscle use  Neuro:  Moving all 4 extremities  Skin:  Warm, dry  Psych:  Normal affect  GI: Mild epigastric tenderness, negative Venegas's, no lower abd tenderness    Plan of Care:   I evaluated the patient and developed an initial plan of care. I discussed this plan and explained that I, or one of my partners, would be returning to complete the evaluation.     Had triglyerides, A1c, US in September.  Labs ordered.     Gaby Nguyen MD  11/10/23 6921

## 2023-11-11 NOTE — PLAN OF CARE
SHIFT NOTE     [id number]    Orientation: A+Ox4  Activity: Independent  Diet/BS Checks: NPO w/ occasional ice chips   Tele:  n/a  IV Access/Drains: R PIV running NS @ 150 ml/hr.   Pain Management: Reports 1/10 epigastric pain- declines need for intervention.   Abnormal VS/Results: AVSS on room air.   Bowel/Bladder: Continent of B/B. LBM noted 11/9. Passing gas, BS active. Strict I&Os.   Skin/Wounds: n/a  Consults: GI  D/C Disposition: pending improvement

## 2023-11-11 NOTE — PHARMACY-ADMISSION MEDICATION HISTORY
Pharmacist Admission Medication History    Admission medication history is complete. The information provided in this note is only as accurate as the sources available at the time of the update.    Information Source(s): Patient via in-person    Changes made to PTA medication list:  Added: MVI  Deleted: prn robaxin, zofran and APAP  Changed: None    Allergies reviewed with patient and updates made in EHR:  RN reviewed    Medication History Completed By: Sylvia Kruse RPH 11/10/2023 7:28 PM    PTA Med List   Medication Sig Last Dose    multivitamin, therapeutic (THERA-VIT) TABS tablet Take 1 tablet by mouth daily

## 2023-11-12 LAB
ALBUMIN SERPL BCG-MCNC: 4.4 G/DL (ref 3.5–5.2)
ALP SERPL-CCNC: 65 U/L (ref 40–129)
ALT SERPL W P-5'-P-CCNC: 27 U/L (ref 0–50)
ANION GAP SERPL CALCULATED.3IONS-SCNC: 13 MMOL/L (ref 7–15)
AST SERPL W P-5'-P-CCNC: 18 U/L (ref 0–35)
BASOPHILS # BLD AUTO: 0 10E3/UL (ref 0–0.2)
BASOPHILS NFR BLD AUTO: 0 %
BILIRUB SERPL-MCNC: 0.5 MG/DL
BUN SERPL-MCNC: 8.1 MG/DL (ref 6–20)
CALCIUM SERPL-MCNC: 9.7 MG/DL (ref 8.6–10)
CHLORIDE SERPL-SCNC: 102 MMOL/L (ref 98–107)
CREAT SERPL-MCNC: 0.76 MG/DL (ref 0.67–1.17)
DEPRECATED HCO3 PLAS-SCNC: 21 MMOL/L (ref 22–29)
EGFRCR SERPLBLD CKD-EPI 2021: >90 ML/MIN/1.73M2
EOSINOPHIL # BLD AUTO: 0.1 10E3/UL (ref 0–0.7)
EOSINOPHIL NFR BLD AUTO: 2 %
GLUCOSE SERPL-MCNC: 86 MG/DL (ref 70–99)
IMM GRANULOCYTES # BLD: 0 10E3/UL
IMM GRANULOCYTES NFR BLD: 0 %
LIPASE SERPL-CCNC: 979 U/L (ref 13–60)
LYMPHOCYTES # BLD AUTO: 1.9 10E3/UL (ref 0.8–5.3)
LYMPHOCYTES NFR BLD AUTO: 34 %
MONOCYTES # BLD AUTO: 0.3 10E3/UL (ref 0–1.3)
MONOCYTES NFR BLD AUTO: 5 %
NEUTROPHILS # BLD AUTO: 3.3 10E3/UL (ref 1.6–8.3)
NEUTROPHILS NFR BLD AUTO: 59 %
NRBC # BLD AUTO: 0 10E3/UL
NRBC BLD AUTO-RTO: 0 /100
POTASSIUM SERPL-SCNC: 4.1 MMOL/L (ref 3.4–5.3)
PROT SERPL-MCNC: 7.5 G/DL (ref 6.4–8.3)
SODIUM SERPL-SCNC: 136 MMOL/L (ref 135–145)
WBC # BLD AUTO: 5.6 10E3/UL (ref 4–11)

## 2023-11-12 PROCEDURE — 250N000011 HC RX IP 250 OP 636: Performed by: INTERNAL MEDICINE

## 2023-11-12 PROCEDURE — 120N000001 HC R&B MED SURG/OB

## 2023-11-12 PROCEDURE — 258N000003 HC RX IP 258 OP 636

## 2023-11-12 PROCEDURE — 85048 AUTOMATED LEUKOCYTE COUNT: CPT | Performed by: INTERNAL MEDICINE

## 2023-11-12 PROCEDURE — 99232 SBSQ HOSP IP/OBS MODERATE 35: CPT | Performed by: INTERNAL MEDICINE

## 2023-11-12 PROCEDURE — 83690 ASSAY OF LIPASE: CPT | Performed by: INTERNAL MEDICINE

## 2023-11-12 PROCEDURE — 80053 COMPREHEN METABOLIC PANEL: CPT | Performed by: INTERNAL MEDICINE

## 2023-11-12 PROCEDURE — 36415 COLL VENOUS BLD VENIPUNCTURE: CPT | Performed by: INTERNAL MEDICINE

## 2023-11-12 RX ORDER — SODIUM CHLORIDE AND POTASSIUM CHLORIDE 150; 900 MG/100ML; MG/100ML
INJECTION, SOLUTION INTRAVENOUS CONTINUOUS
Status: DISCONTINUED | OUTPATIENT
Start: 2023-11-12 | End: 2023-11-13 | Stop reason: HOSPADM

## 2023-11-12 RX ADMIN — ENOXAPARIN SODIUM 40 MG: 40 INJECTION SUBCUTANEOUS at 10:08

## 2023-11-12 RX ADMIN — SODIUM CHLORIDE: 9 INJECTION, SOLUTION INTRAVENOUS at 00:21

## 2023-11-12 RX ADMIN — POTASSIUM CHLORIDE AND SODIUM CHLORIDE: 900; 150 INJECTION, SOLUTION INTRAVENOUS at 10:08

## 2023-11-12 RX ADMIN — POTASSIUM CHLORIDE AND SODIUM CHLORIDE: 900; 150 INJECTION, SOLUTION INTRAVENOUS at 21:03

## 2023-11-12 ASSESSMENT — ACTIVITIES OF DAILY LIVING (ADL)
ADLS_ACUITY_SCORE: 18

## 2023-11-12 NOTE — PROGRESS NOTES
[facility]    Medicine Progress Note - Hospitalist Service    Date of Admission:  11/10/2023    Assessment & Plan   Ade Rodarte is a 17 year old male with past medical history of idiopathic pancreatitis admitted on 11/10/2023 for epigastric pain.      Patient presented to the ED hemodynamically stable, although hypertensive with a BP of 151/97. He states he was eating pizza for dinner 11/08 with friends when shortly following he developed epigastric pain that he describes as a constant, dull ache with intermittent sharp stabbing pains. He states he's experienced this previously in September when he had idiopathic pancreatitis (see below). He endorses two soft but still formed bowel movements and intermittent nausea with his symptoms, but otherwise denies recent history of fever, rash, vomiting, diarrhea, hematochezia or melena. He denies recent alcohol or drug use. No recent travel or illnesses although, upon chart review, patient travelled to hospitals in 2022.     Of note, patient was recently hospitalized from 09/14-09/16 at [facility] for similar symptoms. He was noted at that time to have a lipase of 1,449. Per chart review, RUQ ultrasound normal. LFTs within normal limits. Differential etiologies at the time included EtOH (however, patient denies alcohol use), gallstones (however, alkaline phosphatase wnl and has a normal abdominal ultrasound), hypertriglyceridemia (triglycerides elevated, but not to the level that would account for pancreatitis), medication-induced (on no medications chronically), hypercalcemia (calcium within normal limits w/ normal albumin), anatomical (although visualized portion on ultrasound were normal), infections (no localizing symptoms of infection), toxins (no reported toxin exposure), and genetic risk/autoimmune cause. Pt treated with supportive cares with IVF, had PRN pain and antiemetic medications ordered (but he didn't use). Recommended GI follow-up, which  the patient has scheduled but couldn't see until January 2024.      Idiopathic Acute Pancreatitis   Acute Epigastric Pain - improving    On admission - elevated lipase; CT with evidence of acute interstitial pancreatitis  He is clinically improving somewhat with NPO status and supportive cares    I spoke with [facility] on-call 11/11/23 who would like [facility] to see today as he follows with them and was recently admitted there    Call placed to [facility] on-call who is reviewing the chart this am for further recommendations    Will trial clear liquids this am as his pain is minimal and he is hungry.  Will consider slowly advancing to fulls later today depending on how he tolerates clear liquids    Continue to monitor strict I/O's    Addendum  - 1600  RN reports that he is tolerating clear liquids well with no N/V or increased pain.  Will advance to full liquid diet     Medical Decision Making       51 MINUTES SPENT BY ME on the date of service doing chart review, history, exam, documentation & further activities per the note.        PPE Worn:  Mask, gloves     Diet: Clear Liquid Diet    DVT Prophylaxis: Enoxaparin (Lovenox) SQ  Martinez Catheter: Not present  Lines: None     Cardiac Monitoring: None  Code Status: Full Code      Clinically Significant Risk Factors                                    Disposition Plan      Expected Discharge Date: 11/13/2023                  Jes Hannah DO  Hospitalist Service  [facility]  Securely message with [employer] (more info)  Text page via [employer] Paging/Directory   ______________________________________________________________________    Interval History     Pain in epigastric area present but minimal.   Feeling hungry this am.  No N/V; no loose stools.  No F/C, CP or SOB this am.  Up to the bathroom without lightheadedness or dizziness.     Currently is a college student at [employer].    Physical Exam   Vital Signs: Temp: 98.1  F (36.7  C)  Temp src: Oral BP: 127/67 Pulse: 68   Resp: 17 SpO2: 98 % O2 Device: None (Room air)    Weight: 260 lbs 11.2 oz    GEN:  Alert, oriented x 3, still appears comfortable and cooperative, NAD.  HEENT:  Normocephalic/atraumatic, no scleral icterus, no nasal discharge  CV:  Regular rate and rhythm, no loud murmur or JVD.  S1 + S2 noted  LUNGS:  Clear to auscultation ant/lat bilaterally without rales/rhonchi/wheezing/retractions.  Symmetric chest rise on inhalation noted.  ABD:  Active bowel sounds, soft, notes very min reproducible tenderness in the epigastric area this am   No tenderness in the RUQ/LUQ or lower quadrants bilaterally.  Very mildly distended throughout.  No clear rebound/guarding/rigidity.  EXT:  No pretibial edema bilaterally.  No cyanosis.  No acute joint synovitis noted.  SKIN:  Dry to touch, no exanthems noted in the visualized areas.  PSYCH:  mood appropriate, not tearful or agitated    Medications    0.9% sodium chloride + KCl 20 mEq/L 100 mL/hr at 11/12/23 1008      enoxaparin ANTICOAGULANT  40 mg Subcutaneous Q24H       Data     Labs and Imaging results below reviewed today.  Recent Labs   Lab 11/12/23  0911 11/11/23  0800 11/10/23  1803   WBC 5.6 5.9 7.6   HGB  --  13.8 14.8   HCT  --  42.5 45.4   MCV  --  87 87   PLT  --  222 196     Recent Labs   Lab 11/12/23  0911 11/11/23  0800 11/10/23  1855    139 139   POTASSIUM 4.1 4.4 3.9   CHLORIDE 102 105 103   CO2 21* 25 25   ANIONGAP 13 9 11   GLC 86 104* 116*   BUN 8.1 7.6 9.9   CR 0.76 0.79 0.76   GFRESTIMATED >90 >90 >90   MELANIA 9.7 9.2 9.4     Recent Labs   Lab 11/12/23  0911 11/11/23  0800 11/10/23  1855    139 139   POTASSIUM 4.1 4.4 3.9   CHLORIDE 102 105 103   CO2 21* 25 25   ANIONGAP 13 9 11   GLC 86 104* 116*   BUN 8.1 7.6 9.9   CR 0.76 0.79 0.76   GFRESTIMATED >90 >90 >90   MELANIA 9.7 9.2 9.4   PROTTOTAL 7.5 6.8 7.4   ALBUMIN 4.4 4.2 4.5   BILITOTAL 0.5 0.4 0.3   ALKPHOS 65 56 61   AST 18 19 18   ALT 27 26 30     Recent Labs   Lab  11/12/23  0911 11/11/23  0800 11/10/23  1803   LIPASE 979* 2,491* 2,314*       No results found for this or any previous visit (from the past 24 hour(s)).

## 2023-11-12 NOTE — PLAN OF CARE
Goal Outcome Evaluation:    Summary:   a 17 year old male who presents to the ED on 11/10 with 3 days of recurrent intermittent epigastric pain.  Diagnosis: Idiopathic acute pancreatitis, unspecified complication status    Date & Time: 11/11/23  8756-0004   Orientation: A&O x4   Activity Level: Ind   Fall Risk: No   Behavior & Aggression: Green   Pain Management: Denies   ABNL VS/O2: VS on RA   Tele: N/A   ABNL Lab/BG: Lipase 2491   Diet: NPO exp ice chips   Bowel/Bladder: cont, strict I&O's  Skin: Intact   Drains/Devices: PIV  infusing NS at 100 ml/hr  Tests/Procedures: None   Anticipated DC Date: Pending improvement   Other Important Info: GI, SW consults

## 2023-11-12 NOTE — PLAN OF CARE
SHIFT NOTE      [id number]     Orientation: A+Ox4  Activity: Independent  Diet/BS Checks: Clear liquids- denies N/V  Tele:  n/a  IV Access/Drains: L PIV running NS+K @ 100 ml/hr.   Pain Management: Reports 1/10 epigastric pain- declines need for intervention.   Abnormal VS/Results: AVSS on room air.   Bowel/Bladder: Continent of B/B. LBM noted 11/9. Passing gas, BS active. Strict I&Os.   Skin/Wounds: n/a  Consults: GI, SW  D/C Disposition: pending improvement

## 2023-11-12 NOTE — PROGRESS NOTES
Brief GI note:  Patient previously admitted to [facility] with idiopathic acute pancreatitis in September. Was suppose to follow up with Dr. Jameson as an outpatient for further work up, but has not happened yet. Now readmitted at [facility] with recurrent acute pancreatitis. Patient not seen but labs, imaging, and clinical course reviewed. Patient has mild interstitial pancreatitis with BISAP of 0. Pain improving today. Etiology still unclear. Will need to follow up as an outpatient as genetic testing planned. Continue to manage with supportive care. Diet can be advance once pain improves and discharged once able to tolerate diet.     Tor Mann MD  [facility]  Division of Gastroenterology and Hepatology  [facility] [address] [place], [place] [address]

## 2023-11-12 NOTE — PLAN OF CARE
Goal Outcome Evaluation:       [id number]    Orientation: A&O x4  Activity: independent  Diet/BS Checks: NPO with ice chips  Tele:  N/S  IV Access/Drains: PIV SL   Pain Management: reports abdominal pain 1/10- denies interventions   Abnormal VS/Results: VSS on RA  Bowel/Bladder: continent   Skin/Wounds: WDL  Consults: GI  D/C Disposition: pending  Other Info:

## 2023-11-13 VITALS
WEIGHT: 260.7 LBS | RESPIRATION RATE: 18 BRPM | OXYGEN SATURATION: 97 % | HEIGHT: 78 IN | TEMPERATURE: 97.9 F | SYSTOLIC BLOOD PRESSURE: 134 MMHG | BODY MASS INDEX: 30.16 KG/M2 | DIASTOLIC BLOOD PRESSURE: 91 MMHG | HEART RATE: 84 BPM

## 2023-11-13 PROCEDURE — 99222 1ST HOSP IP/OBS MODERATE 55: CPT | Performed by: PHYSICIAN ASSISTANT

## 2023-11-13 PROCEDURE — 99239 HOSP IP/OBS DSCHRG MGMT >30: CPT | Performed by: STUDENT IN AN ORGANIZED HEALTH CARE EDUCATION/TRAINING PROGRAM

## 2023-11-13 PROCEDURE — 250N000011 HC RX IP 250 OP 636: Performed by: INTERNAL MEDICINE

## 2023-11-13 RX ADMIN — POTASSIUM CHLORIDE AND SODIUM CHLORIDE: 900; 150 INJECTION, SOLUTION INTRAVENOUS at 10:25

## 2023-11-13 ASSESSMENT — ACTIVITIES OF DAILY LIVING (ADL)
DEPENDENT_IADLS:: INDEPENDENT
ADLS_ACUITY_SCORE: 18

## 2023-11-13 NOTE — CONSULTS
Care Management Initial Consult    General Information  Assessment completed with: Patient, Josue  Type of CM/SW Visit: Initial Assessment    Primary Care Provider verified and updated as needed: Yes   Readmission within the last 30 days: no previous admission in last 30 days      Reason for Consult: decision-making  Advance Care Planning:            Communication Assessment  Patient's communication style: spoken language (English or Bilingual)    Hearing Difficulty or Deaf: no   Wear Glasses or Blind: no    Cognitive  Cognitive/Neuro/Behavioral: WDL                      Living Environment:   People in home: alone     Current living Arrangements: apartment      Able to return to prior arrangements: yes       Family/Social Support:  Care provided by: self  Provides care for: no one  Marital Status: Single  Parent(s)          Description of Support System: Supportive, Involved         Current Resources:   Patient receiving home care services: No     Community Resources: None  Equipment currently used at home: none  Supplies currently used at home: None    Employment/Financial:  Employment Status: student        Financial Concerns:             Does the patient's insurance plan have a 3 day qualifying hospital stay waiver?  No    Lifestyle & Psychosocial Needs:  Social Determinants of Health     Food Insecurity: Not on file   Depression: Not on file   Housing Stability: Not on file   Tobacco Use: Low Risk  (9/27/2023)    Patient History     Smoking Tobacco Use: Never     Smokeless Tobacco Use: Never     Passive Exposure: Not on file   Financial Resource Strain: Not on file   Alcohol Use: Not on file   Transportation Needs: Not on file   Physical Activity: Not on file   Interpersonal Safety: Not on file   Stress: Not on file   Social Connections: Not on file       Functional Status:  Prior to admission patient needed assistance:   Dependent ADLs:: Independent  Dependent IADLs:: Independent       Mental Health Status:           Chemical Dependency Status:                Values/Beliefs:  Spiritual, Cultural Beliefs, [employer] Practices, Values that affect care: yes (Mandaen)               Additional Information:  Per consult for discharge planning and scheduling follow-up appointments, met with patient to discuss discharge plan.  Discussed that pt has GI follow-up scheduled on Jan. 25th.  Patient stated understanding of this.  Discussed PCP follow-up and scheduled pt to see his Primary Skyla Campos on November 21st at 10:30 AM.  Faxed discharge orders to [facility] [phone].    DEE DEE Simental RN, BSN, OCN   Inpatient Care Coordination [address]  Office: [phone]

## 2023-11-13 NOTE — PROGRESS NOTES
Care Management Discharge Note    Discharge Date: 11/13/2023       Discharge Disposition: Home    Discharge Services: None    Discharge DME: None    Discharge Transportation:      Private pay costs discussed: Not applicable    Does the patient's insurance plan have a 3 day qualifying hospital stay waiver?  No    PAS Confirmation Code:    Patient/family educated on Medicare website which has current facility and service quality ratings: no    Education Provided on the Discharge Plan:    Persons Notified of Discharge Plans: N/A  Patient/Family in Agreement with the Plan: yes    Handoff Referral Completed: No    Additional Information:  Patient has discharge orders for home with PCP and GI follow-up scheduled.  Patient has ride home today.      DEE DEE Simental RN, BSN, OCN   Inpatient Care Coordination [address]  Office: [phone]

## 2023-11-13 NOTE — PLAN OF CARE
Goal Outcome Evaluation:    Summary:   a 17 year old male who presents to the ED on 11/10 with 3 days of recurrent intermittent epigastric pain.  Diagnosis: Idiopathic acute pancreatitis, unspecified complication status     Date & Time: 11/12/23  [id number]   Orientation: A&O x4   Activity Level: Ind   Fall Risk: No   Behavior & Aggression: Green   Pain Management: Denies   ABNL VS/O2: VS on RA   Tele: N/A   ABNL Lab/BG: Lipase 979   Diet: NPO exp ice chips   Bowel/Bladder: cont, strict I&O's  Skin: Intact   Drains/Devices: PIV  infusing 0.9 NS +Kcl 20 meq  Tests/Procedures: None   Anticipated DC Date: Pending improvement   Other Important Info: GI, SW consults

## 2023-11-13 NOTE — PLAN OF CARE
Discharge Note    Patient discharged to home via private vehicle  accompanied by other father .  IV: Discontinued  Prescriptions N/A.   Belongings reviewed and sent with patient.   Home medications returned to patient: NA  Equipment sent with: patient, N/A.   patient verbalizes understanding of discharge instructions. AVS given to patient.

## 2023-11-13 NOTE — DISCHARGE SUMMARY
[facility]  Hospitalist Discharge Summary      Date of Admission:  11/10/2023  Date of Discharge:  11/13/2023  Discharging Provider: Mingo Gonzales MD  Discharge Service: Hospitalist Service    Discharge Diagnoses   Idiopathic acute pancreatitis, mild  Acute epigastric pain, resolved    Clinically Significant Risk Factors          Follow-ups Needed After Discharge   Follow-up Appointments     Follow-up and recommended labs and tests       Follow up with primary care provider, Skyla Campos on November 21st at   10:30 AM for an after hospital stay follow-up appointment.      Follow up with gastroenterology in clinic as scheduled for further testing   of pancreatitis causes.  Outpatient follow up scheduled 1/25/24 at [facility]  [facility], will send message to see if   patient can be seen sooner though he reported he is in [place] for   majority of January.          Unresulted Labs Ordered in the Past 30 Days of this Admission       No orders found from 10/11/2023 to 11/11/2023.        These results will be followed up by N/A    Discharge Disposition   Discharged to home  Condition at discharge: Stable    Hospital Course   Ade Rodarte is a 17 year old male with past medical history of idiopathic pancreatitis admitted on 11/10/2023 for epigastric pain.  He was found to have recurrent pancreatitis.  He was treated with IV fluids, and pain control.  He was tolerating regular diet prior to discharge.  He will plan to follow-up with outpatient gastroenterology at [facility] in January 2024 which is the earliest he can be seen.  No obvious etiology identified for cause of recurrent pancreatitis.      Of note, patient was recently hospitalized from 09/14-09/16 at [facility] for similar symptoms. He was noted at that time to have a lipase of 1,449. Per chart review, RUQ ultrasound normal. LFTs within normal limits. Differential etiologies at the time included EtOH (however,  patient denies alcohol use), gallstones (however, alkaline phosphatase wnl and has a normal abdominal ultrasound), hypertriglyceridemia (triglycerides elevated, but not to the level that would account for pancreatitis), medication-induced (on no medications chronically), hypercalcemia (calcium within normal limits w/ normal albumin), anatomical (although visualized portion on ultrasound were normal), infections (no localizing symptoms of infection), toxins (no reported toxin exposure), and genetic risk/autoimmune cause. Pt treated with supportive cares with IVF, had PRN pain and antiemetic medications ordered (but he didn't use). Recommended GI follow-up, which the patient has scheduled but couldn't see until January 2024.     Consultations This Hospital Stay   GASTROENTEROLOGY IP CONSULT  CARE MANAGEMENT / SOCIAL WORK IP CONSULT  GI LUMINAL ADULT IP CONSULT    Code Status   Prior    Time Spent on this Encounter   I, Mingo Gonzales MD, personally saw the patient today and spent greater than 30 minutes discharging this patient.       Mingo Gonzales MD  [place] [address] [facility]  [address] [place] [address], [address] [address]  Phone: [phone]  ______________________________________________________________________    Physical Exam   Vital Signs: Temp: 97.9  F (36.6  C) Temp src: Oral BP: (!) 134/91 Pulse: 84   Resp: 18 SpO2: 97 % O2 Device: None (Room air)    Weight: 260 lbs 11.2 oz  Constitutional: Awake, alert, cooperative, no apparent distress  Respiratory: Clear to auscultation bilaterally, no crackles or wheezing  Cardiovascular: Regular rate and rhythm, normal S1 and S2, and no murmur noted  GI: Normal bowel sounds, soft, non-distended, non-tender  Skin/Integumen: No rashes, no cyanosis, no edema  Other:          Primary Care Physician   Skyla Campos    Discharge Orders      Reason for your hospital stay    You were in the hospital due to pancreatitis. We do not know the cause of  your pancreatitis. You will need to follow up with the [facility] Gastroenterology team in clinic for further workup.     Follow-up and recommended labs and tests     Follow up with primary care provider, Skyla Campos on November 21st at 10:30 AM for an after hospital stay follow-up appointment.      Follow up with gastroenterology in clinic as scheduled for further testing of pancreatitis causes.  Outpatient follow up scheduled 1/25/24 at [facility]  [facility], will send message to see if patient can be seen sooner though he reported he is in [place] for majority of January.     Activity    Your activity upon discharge: activity as tolerated     Diet    Follow this diet upon discharge: Orders Placed This Encounter      Regular Diet Adult       Significant Results and Procedures   Most Recent 3 CBC's:  Recent Labs   Lab Test 11/12/23  0911 11/11/23  0800 11/10/23  1803 11/02/23  0941   WBC 5.6 5.9 7.6 5.1   HGB  --  13.8 14.8 15.7   MCV  --  87 87 87   PLT  --  222 196 235     Most Recent 3 BMP's:  Recent Labs   Lab Test 11/12/23  0911 11/11/23  0800 11/10/23  1855    139 139   POTASSIUM 4.1 4.4 3.9   CHLORIDE 102 105 103   CO2 21* 25 25   BUN 8.1 7.6 9.9   CR 0.76 0.79 0.76   ANIONGAP 13 9 11   MELANIA 9.7 9.2 9.4   GLC 86 104* 116*     Most Recent 2 LFT's:  Recent Labs   Lab Test 11/12/23  0911 11/11/23  0800   AST 18 19   ALT 27 26   ALKPHOS 65 56   BILITOTAL 0.5 0.4   ,   Results for orders placed or performed during the hospital encounter of 11/10/23   CT Abdomen Pelvis w Contrast    Narrative    EXAM: CT ABDOMEN PELVIS W CONTRAST  LOCATION: [facility]  DATE: 11/10/2023    INDICATION: Pancreatitis.  Epigastric abdominal pain.  Evaluate for mass.  Abscess.  Phlegmon.  Pseudocyst.  COMPARISON: None.  TECHNIQUE: CT scan of the abdomen and pelvis was performed following injection of IV contrast. Multiplanar reformats were obtained. Dose  reduction techniques were used.  CONTRAST: 131 mL Isovue 370    FINDINGS:   LOWER CHEST: Normal.    HEPATOBILIARY: Normal.    PANCREAS: There is mild ill definition of the pancreatic margins with minimal surrounding soft tissue haziness compatible with acute interstitial pancreatitis. Mild prominence of the pancreatic duct. No fluid collections    SPLEEN: Normal.    ADRENAL GLANDS: Normal.    KIDNEYS/BLADDER: Normal.    BOWEL: Normal.    LYMPH NODES: Normal.    VASCULATURE: Unremarkable.    PELVIC ORGANS: Normal.    MUSCULOSKELETAL: Normal.      Impression    IMPRESSION:   1.  Mild changes of acute interstitial pancreatitis.       Discharge Medications   Discharge Medication List as of 11/13/2023 11:44 AM        CONTINUE these medications which have NOT CHANGED    Details   multivitamin, therapeutic (THERA-VIT) TABS tablet Take 1 tablet by mouth daily, Historical           Allergies   No Known Allergies

## 2023-11-13 NOTE — PLAN OF CARE
9400-0121  Orientation: A&O x4  Activity: ind.  Diet/BS Checks: full liquid  Tele:  n/a  IV Access/Drains: LA PIV infusing NS + kcl 20 meq 75 mL/hr  Pain Management: denies  Abnormal VS/Results: VSS on RA  Bowel/Bladder: cont B/B, strict I/Os  Skin/Wounds: WDL  Consults: GI  D/C Disposition: pending  Other Info:

## 2023-11-15 ENCOUNTER — PATIENT OUTREACH (OUTPATIENT)
Dept: CARE COORDINATION | Facility: CLINIC | Age: 18
End: 2023-11-15
Payer: COMMERCIAL

## 2023-11-15 NOTE — PROGRESS NOTES
Clinic Care Coordination Contact  [facility]: Post-Discharge Note  SITUATION                                                      Admission:    Admission Date: 11/10/23   Reason for Admission: Idiopathic acute pancreatitis, mild  Acute epigastric pain, resolved  Discharge:   Discharge Date: 11/13/23  Discharge Diagnosis: Idiopathic acute pancreatitis, mild  Acute epigastric pain, resolved    BACKGROUND                                                      Per hospital discharge summary and inpatient provider notes:    Hospital Course  Ade Rodarte is a 17 year old male with past medical history of idiopathic pancreatitis admitted on 11/10/2023 for epigastric pain.  He was found to have recurrent pancreatitis.  He was treated with IV fluids, and pain control.  He was tolerating regular diet prior to discharge.  He will plan to follow-up with outpatient gastroenterology at [facility] in January 2024 which is the earliest he can be seen.  No obvious etiology identified for cause of recurrent pancreatitis.      Of note, patient was recently hospitalized from 09/14-09/16 at [facility] for similar symptoms. He was noted at that time to have a lipase of 1,449. Per chart review, RUQ ultrasound normal. LFTs within normal limits. Differential etiologies at the time included EtOH (however, patient denies alcohol use), gallstones (however, alkaline phosphatase wnl and has a normal abdominal ultrasound), hypertriglyceridemia (triglycerides elevated, but not to the level that would account for pancreatitis), medication-induced (on no medications chronically), hypercalcemia (calcium within normal limits w/ normal albumin), anatomical (although visualized portion on ultrasound were normal), infections (no localizing symptoms of infection), toxins (no reported toxin exposure), and genetic risk/autoimmune cause. Pt treated with supportive cares with IVF, had PRN pain and antiemetic medications ordered (but he didn't use). Recommended GI  "follow-up, which the patient has scheduled but couldn't see until January 2024.     ASSESSMENT           Discharge Assessment  How are you doing now that you are home?: \"I'm good, feeling pretty normal right now, no pain.\"  How are your symptoms? (Red Flag symptoms escalate to triage hotline per guidelines): Improved  Do you feel your condition is stable enough to be safe at home until your provider visit?: Yes  Does the patient have their discharge instructions? : Yes  Does the patient have questions regarding their discharge instructions? : No  Were you started on any new medications or were there changes to any of your previous medications? : No  Does the patient have all of their medications?: Yes  Do you have questions regarding any of your medications? : No  Do you have all of your needed medical supplies or equipment (DME)?  (i.e. oxygen tank, CPAP, cane, etc.): Yes  Discharge follow-up appointment scheduled within 14 calendar days? : Yes  Discharge Follow Up Appointment Date: 11/21/23  Discharge Follow Up Appointment Scheduled with?: Primary Care Provider ([facility])         Post-op (Clinicians Only)  Did the patient have surgery or a procedure: No      PLAN                                                      Outpatient Plan:      Follow-ups Needed After Discharge  Follow-up Appointments     Follow-up and recommended labs and tests       Follow up with primary care provider, Skyla Campos on November 21st at 10:30 AM for an after hospital stay follow-up appointment.       Follow up with gastroenterology in clinic as scheduled for further testing of pancreatitis causes.  Outpatient follow up scheduled 1/25/24 at [facility], will send message to see if patient can be seen sooner though he reported he is in [place] for majority of January.     Future Appointments   Date Time Provider Department Center   1/25/2024  8:00 AM Guru Sandra Jameson " MD [facility]         For any urgent concerns, please contact our 24 hour nurse triage line: [phone] ([phone])         Kelly Wade RN

## 2023-12-07 ENCOUNTER — PATIENT OUTREACH (OUTPATIENT)
Dept: GASTROENTEROLOGY | Facility: CLINIC | Age: 18
End: 2023-12-07

## 2023-12-07 ENCOUNTER — OFFICE VISIT (OUTPATIENT)
Dept: GASTROENTEROLOGY | Facility: CLINIC | Age: 18
End: 2023-12-07
Payer: COMMERCIAL

## 2023-12-07 VITALS
SYSTOLIC BLOOD PRESSURE: 112 MMHG | BODY MASS INDEX: 30.56 KG/M2 | OXYGEN SATURATION: 96 % | WEIGHT: 264.1 LBS | DIASTOLIC BLOOD PRESSURE: 81 MMHG | HEART RATE: 85 BPM | HEIGHT: 78 IN

## 2023-12-07 DIAGNOSIS — K85.90 ACUTE PANCREATITIS, UNSPECIFIED COMPLICATION STATUS, UNSPECIFIED PANCREATITIS TYPE: ICD-10-CM

## 2023-12-07 PROCEDURE — 99205 OFFICE O/P NEW HI 60 MIN: CPT | Performed by: INTERNAL MEDICINE

## 2023-12-07 ASSESSMENT — PAIN SCALES - GENERAL: PAINLEVEL: NO PAIN (0)

## 2023-12-07 NOTE — PROGRESS NOTES
"Chief Complaint   Patient presents with    New Patient       Vitals:    12/07/23 0837   BP: 112/81   BP Location: Left arm   Patient Position: Sitting   Cuff Size: Adult Large   Pulse: 85   SpO2: 96%   Weight: 119.8 kg (264 lb 1.6 oz)   Height: 2.032 m (6' 8\")       Body mass index is 29.01 kg/m .    Meghan Chavez    "

## 2023-12-07 NOTE — PATIENT INSTRUCTIONS
Fernando Booker,     Dr. Jameson has outlined the following steps after your recent clinic visit:    #1 Schedule an endoscopic ultrasound approximately 6 to 8 weeks from the most recent attack of acute pancreatitis.    #2 Genetic counselor referral.  #3 Recommend continued abstinence from alcohol and smoking.  #4 Contact our clinic in the event of another attack of acute pancreatitis.    It is a pleasure being involved in your health care. Please call with any questions or concerns regarding your clinic visit.    Deidre Doyle RN, BSN  Care Coordinator  Advanced Endoscopy   Phone: [phone]      Contacts post-consultation depending on your need:    Schedule Clinic Appointments            [phone]    OR Procedure Scheduling                             [phone]    For urgent/emergent questions after business hours, you may reach the on-call GI Fellow by contacting the [facility]  at [phone].    How do I schedule labs, imaging studies, or procedures that were ordered in clinic today?     Labs: To schedule lab appointment at the Clinic and Surgery Center, use [facility] or call [phone]. If you have a [place] lab closer to home where you are regularly seen you can give them a call.     Procedures: If a colonoscopy, upper endoscopy, breath test, esophageal manometry, or pH impedence was ordered today, our endoscopy team will call you to schedule this. If you have not heard from our endoscopy team within a week, please call [phone] to schedule.     Imaging Studies: If you were scheduled for a CT scan, X-ray, MRI, ultrasound, HIDA scan or other imaging study, please call [phone] to have this scheduled.     Referral: If a referral to another specialty was ordered, expect a phone call or follow instructions above. If you have not heard from anyone regarding your referral in a week, please call our clinic to check the status.     How to I schedule a follow-up visit?  If you  did not schedule a follow-up visit today, please call [phone] to schedule a follow-up office visit.

## 2023-12-07 NOTE — PROGRESS NOTES
Pancreas clinic initial consultation note    Referring physician: Dr Misa Jorge    History of present illness:    This is a very pleasant 17-year-old male sophomore from [place] with no significant past medical history  Who has been admitted for 2 recurrent episodes of acute pancreatitis once in September and another more recently during November 13.  [facility]  His hospital course was uncomplicated.  Of note he did not develop any organ failure during the hospitalization or ICU stay.  His admission labs did not show any abnormal liver function test or abnormal triglycerides or calcium levels.  He was discharged home on on oral diet.  He got readmitted in November 13, 2023 after eating pizza with his parents with worsening abdominal pain which persisted.  Again he was hospitalized for 3 days at [facility] without organ failure or need for ICU and he was discharged on oral diet.    Patient denies any new medications which could cause pancreatitis.  He did have skeletal muscle relaxants for back pain which she took intermittently.      Past medical history  Recurrent episodes of acute pancreatitis once in September and once in November 2023  Past surgical history  None  Social history  No history of smoking alcohol or IV drug abuse.  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  His father is  and his mother is [place].  He has 2 younger siblings both are healthy    Family history no history of recurrent pancreatitis among family members.  No history of GI cancers including pancreas cancer bile duct cancer gallbladder cancer and colon cancer running in the family    Allergies no known drug allergies    Assessment and plan  This is very pleasant 17-year-old male sophomore  with 2 discrete episodes of acute pancreatitis characterized by abdominal pain elevated lipase and CT findings  requiring 3 to 4 days of hospital stay in November recently is here for establishing pancreas biliary care with me for idiopathic recurrent acute pancreatitis.  The following are my recommendations    #1 patient will be scheduled for an endoscopic ultrasound approximately 6 to 8 weeks from the most recent attack of acute pancreatitis.  EUS will enable us to identify microlithiasis or sludge in the gallbladder which could potentially cause pancreatitis.  His liver function tests were completely normal during both the hospital admissions which argues against a biliary etiology.  Since he has had 2 episodes of pancreatitis it is reasonable to pursue an endoscopic ultrasound to evaluate for biliary pathology.  During the EUS we will also try to identify pancreas divisum as pancreas divisum has been known to cause recurrent attacks of pancreatitis.  #2 Will recommend referral to genetic counselor to possibly perform genetic tests for pancreatitis.  PRS S1, CFTR, [place] 1, CTR C some of the common genes associated with territory pancreatitis.  Again patient reports no family history of pancreatitis but he is of mixed heritage.  It will be prudent to consider genetic testing given how young he is.  #3 will recommend continued abstinence from alcohol and smoking he has never had alcohol and we will recommend him to continue the same  #4 if he gets another attack of acute pancreatitis he has been recommended to call us immediately    #5 follow-up visit based on EUS findings  #6 Patient is is a participant of our ongoing DREAM study for which I am a core  along with Dr. Kristin Mcgregor  60 minutes spent on the date of the encounter doing chart review, review of outside records, review of test results, interpretation of tests, patient visit, documentation and discussion with other provider(s)

## 2023-12-07 NOTE — PROGRESS NOTES
Following review of referral, per Dr. Jameson:  EUS for evaluation of acute episodes of pancreatitis.     Discussed procedure and indication with Dr. Jameson in clinic this morning. Discussed that procedure would be done at [facility] in [place] and that patient would require a  that day.     Patient agreeable to proceed with recommendations. Gastro procedure order placed. Patient advised that they should hear from endoscopy scheduling team in the coming days..    Provided with clinic contact information for further questions or concerns.     Deidre Doyle RN Care Coordinator

## 2023-12-07 NOTE — LETTER
"    12/7/2023         RE: Ade Rodarte  [address] [address]        Dear Colleague,    Thank you for referring your patient, Ade oRdarte, to the [facility] [place]. Please see a copy of my visit note below.    Chief Complaint   Patient presents with    New Patient       Vitals:    12/07/23 0837   BP: 112/81   BP Location: Left arm   Patient Position: Sitting   Cuff Size: Adult Large   Pulse: 85   SpO2: 96%   Weight: 119.8 kg (264 lb 1.6 oz)   Height: 2.032 m (6' 8\")       Body mass index is 29.01 kg/m .    [facility]      Pancreas clinic initial consultation note    Referring physician: Dr Misa Jorge    History of present illness:    This is a very pleasant 17-year-old male sophomore from [place] with no significant past medical history  Who has been admitted for 2 recurrent episodes of acute pancreatitis once in September and another more recently during November 13.  He was initially admitted to the [facility] in September for over 4 days.  His hospital course was uncomplicated.  Of note he did not develop any organ failure during the hospitalization or ICU stay.  His admission labs did not show any abnormal liver function test or abnormal triglycerides or calcium levels.  He was discharged home on on oral diet.  He got readmitted in November 13, 2023 after eating pizza with his parents with worsening abdominal pain which persisted.  Again he was hospitalized for 3 days at [facility] without organ failure or need for ICU and he was discharged on oral diet.    Patient denies any new medications which could cause pancreatitis.  He did have skeletal muscle relaxants for back pain which she took intermittently.      Past medical history  Recurrent episodes of acute pancreatitis once in September and once in November 2023  Past surgical history  None  Social history  No history of smoking alcohol or IV drug abuse.  He currently goes to to " [employer] [facility] [employer] at [place].  His father is  and his mother is Thai.  He has 2 younger siblings both are healthy    Family history no history of recurrent pancreatitis among family members.  No history of GI cancers including pancreas cancer bile duct cancer gallbladder cancer and colon cancer running in the family    Allergies no known drug allergies    Assessment and plan  This is very pleasant 17-year-old male sophomore  with 2 discrete episodes of acute pancreatitis characterized by abdominal pain elevated lipase and CT findings requiring 3 to 4 days of hospital stay in November recently is here for establishing pancreas biliary care with me for idiopathic recurrent acute pancreatitis.  The following are my recommendations    #1 patient will be scheduled for an endoscopic ultrasound approximately 6 to 8 weeks from the most recent attack of acute pancreatitis.  EUS will enable us to identify microlithiasis or sludge in the gallbladder which could potentially cause pancreatitis.  His liver function tests were completely normal during both the hospital admissions which argues against a biliary etiology.  Since he has had 2 episodes of pancreatitis it is reasonable to pursue an endoscopic ultrasound to evaluate for biliary pathology.  During the EUS we will also try to identify pancreas divisum as pancreas divisum has been known to cause recurrent attacks of pancreatitis.  #2 Will recommend referral to genetic counselor to possibly perform genetic tests for pancreatitis.  PRS S1, CFTR, [place] 1, CTR C some of the common genes associated with territory pancreatitis.  Again patient reports no family history of pancreatitis but he is of mixed heritage.  It will be prudent to consider genetic testing given how young he is.  #3 will recommend continued abstinence from alcohol and smoking he has never had alcohol and we will recommend him to continue the same  #4 if he gets  another attack of acute pancreatitis he has been recommended to call us immediately    #5 follow-up visit based on EUS findings  #6 Patient is is a participant of our ongoing DREAM study for which I am a core  along with Dr. Kristin Mcgregor  60 minutes spent on the date of the encounter doing chart review, review of outside records, review of test results, interpretation of tests, patient visit, documentation and discussion with other provider(s)            Again, thank you for allowing me to participate in the care of your patient.      Sincerely,    Guru Trino MD

## 2023-12-14 ENCOUNTER — HOSPITAL ENCOUNTER (OUTPATIENT)
Dept: RESEARCH | Facility: CLINIC | Age: 18
Discharge: HOME | End: 2023-12-14
Attending: PEDIATRICS | Admitting: PEDIATRICS
Payer: COMMERCIAL

## 2023-12-14 ENCOUNTER — LAB REQUISITION (OUTPATIENT)
Dept: LAB | Facility: CLINIC | Age: 18
End: 2023-12-14

## 2023-12-14 DIAGNOSIS — Z00.6 EXAMINATION OF PARTICIPANT OR CONTROL IN CLINICAL RESEARCH: Primary | ICD-10-CM

## 2023-12-14 LAB
BASOPHILS # BLD AUTO: 0 10E3/UL (ref 0–0.2)
BASOPHILS NFR BLD AUTO: 0 %
CREAT SERPL-MCNC: 0.79 MG/DL (ref 0.67–1.17)
EGFRCR SERPLBLD CKD-EPI 2021: >90 ML/MIN/1.73M2
EOSINOPHIL # BLD AUTO: 0.1 10E3/UL (ref 0–0.7)
EOSINOPHIL NFR BLD AUTO: 3 %
ERYTHROCYTE [DISTWIDTH] IN BLOOD BY AUTOMATED COUNT: 13.5 % (ref 10–15)
FASTING STATUS PATIENT QL REPORTED: ABNORMAL
GLUCOSE SERPL-MCNC: 105 MG/DL (ref 70–99)
HBA1C MFR BLD: 6.4 %
HCT VFR BLD AUTO: 43 % (ref 40–53)
HGB BLD-MCNC: 14.4 G/DL (ref 13.3–17.7)
IMM GRANULOCYTES # BLD: 0 10E3/UL
IMM GRANULOCYTES NFR BLD: 0 %
LYMPHOCYTES # BLD AUTO: 2.1 10E3/UL (ref 0.8–5.3)
LYMPHOCYTES NFR BLD AUTO: 41 %
MCH RBC QN AUTO: 29 PG (ref 26.5–33)
MCHC RBC AUTO-ENTMCNC: 33.5 G/DL (ref 31.5–36.5)
MCV RBC AUTO: 87 FL (ref 78–100)
MONOCYTES # BLD AUTO: 0.3 10E3/UL (ref 0–1.3)
MONOCYTES NFR BLD AUTO: 6 %
NEUTROPHILS # BLD AUTO: 2.6 10E3/UL (ref 1.6–8.3)
NEUTROPHILS NFR BLD AUTO: 50 %
NRBC # BLD AUTO: 0 10E3/UL
NRBC BLD AUTO-RTO: 0 /100
PLATELET # BLD AUTO: 206 10E3/UL (ref 150–450)
RBC # BLD AUTO: 4.96 10E6/UL (ref 4.4–5.9)
WBC # BLD AUTO: 5.1 10E3/UL (ref 4–11)

## 2023-12-14 PROCEDURE — 82565 ASSAY OF CREATININE: CPT | Performed by: PEDIATRICS

## 2023-12-14 PROCEDURE — 510N000016 HC RESEARCH MEALS, PER MEAL

## 2023-12-14 PROCEDURE — 83036 HEMOGLOBIN GLYCOSYLATED A1C: CPT | Performed by: PEDIATRICS

## 2023-12-14 PROCEDURE — 82947 ASSAY GLUCOSE BLOOD QUANT: CPT | Performed by: PEDIATRICS

## 2023-12-14 PROCEDURE — 85025 COMPLETE CBC W/AUTO DIFF WBC: CPT | Performed by: PEDIATRICS

## 2023-12-14 PROCEDURE — 510N000018 HC RESEARCH OGTT, PER HOUR

## 2023-12-14 PROCEDURE — 250N000009 HC RX 250: Performed by: PEDIATRICS

## 2023-12-14 PROCEDURE — 510N000017 HC CRU PATIENT CARE, PER 15 MIN

## 2023-12-14 PROCEDURE — 510N000009 HC RESEARCH FACILITY, PER 15 MIN

## 2023-12-14 RX ADMIN — ALCOHOL 75 G: 65 GEL TOPICAL at 08:55

## 2023-12-14 NOTE — ADDENDUM NOTE
Encounter addended by: Ayanna Chamberlain RN on: 12/14/2023 11:16 AM   Actions taken: Charge Capture section accepted

## 2023-12-15 ENCOUNTER — ANCILLARY PROCEDURE (OUTPATIENT)
Dept: RADIOLOGY | Facility: CLINIC | Age: 18
End: 2023-12-15
Attending: PHYSICIAN ASSISTANT
Payer: COMMERCIAL

## 2023-12-15 DIAGNOSIS — Z00.6 EXAMINATION OF PARTICIPANT OR CONTROL IN CLINICAL RESEARCH: ICD-10-CM

## 2023-12-29 ENCOUNTER — TELEPHONE (OUTPATIENT)
Dept: GASTROENTEROLOGY | Facility: CLINIC | Age: 18
End: 2023-12-29
Payer: COMMERCIAL

## 2023-12-29 NOTE — TELEPHONE ENCOUNTER
"Endoscopy Scheduling Screen    Have you had a positive Covid test in the last 14 days?  No    Are you active on MyChart?   Yes    What insurance is in the chart?  Other:  [facility]    Ordering/Referring Provider:  GURU JASMYN   (If ordering provider performs procedure, schedule with ordering provider unless otherwise instructed. )    BMI: Estimated body mass index is 29.01 kg/m  as calculated from the following:    Height as of 12/7/23: 2.032 m (6' 8\").    Weight as of 12/7/23: 119.8 kg (264 lb 1.6 oz).     Sedation Ordered  MAC/deep sedation.   BMI<= 45 45 < BMI <= 48 48 < BMI < = 50  BMI > 50   No Restrictions No [facility]  No [facility]  [facility] with exceptions Hospital Only OR Only       Are you taking any prescription medications for pain 3 or more times per week?   NO - No RN review required.    Do you have a history of malignant hyperthermia or adverse reaction to anesthesia?  No    Have you been diagnosed or told you have pulmonary hypertension?   No    Do you have an LVAD?  No    Have you been told you have moderate to severe sleep apnea?  No    Have you been told you have COPD, asthma, or any other lung disease?  No    Do you have any heart conditions?  No     Have you ever had an organ transplant?   No    Have you ever had or are you awaiting a heart or lung transplant?   No    Have you had a stroke or transient ischemic attack (TIA aka \"mini stroke\" in the last 6 months?   No    Have you been diagnosed with or been told you have cirrhosis of the liver?   No    Are you currently on dialysis?   No    Do you need assistance transferring?   No    BMI: Estimated body mass index is 29.01 kg/m  as calculated from the following:    Height as of 12/7/23: 2.032 m (6' 8\").    Weight as of 12/7/23: 119.8 kg (264 lb 1.6 oz).     Is patients BMI > 40 and scheduling location [facility]?  No    Do you take an injectable medication for weight loss or diabetes (excluding insulin)?  No    Do you take the medication " Naltrexone?  No    Do you take blood thinners?  No       Prep   Are you currently on dialysis or do you have chronic kidney disease?  No    Do you have a diagnosis of diabetes?  No    Do you have a diagnosis of cystic fibrosis (CF)?  No    On a regular basis do you go 3 -5 days between bowel movements?  No    BMI > 40?  No    Preferred Pharmacy:    [id number] - [place], [place] - [address] AT [address]  [address]  [place] [address]  Phone: [phone] Fax: [phone]    [id number] - [place], [place] - [address]  [address] [place] [address]  Phone: [phone] Fax: [phone]      Final Scheduling Details   Colonoscopy prep sent?  N/A    Procedure scheduled  Endoscopic ultrasound (EUS)    Surgeon:       Date of procedure:  2/20/2024     Pre-OP / PAC:   No - Not required for this site.    Location  [facility] - Per order.    Sedation   MAC/Deep Sedation - Per order.      Patient Reminders:   You will receive a call from a Nurse to review instructions and health history.  This assessment must be completed prior to your procedure.  Failure to complete the Nurse assessment may result in the procedure being cancelled.      On the day of your procedure, please designate an adult(s) who can drive you home stay with you for the next 24 hours. The medicines used in the exam will make you sleepy. You will not be able to drive.      You cannot take public transportation, ride share services, or non-medical taxi service without a responsible caregiver.  Medical transport services are allowed with the requirement that a responsible caregiver will receive you at your destination.  We require that drivers and caregivers are confirmed prior to your procedure.

## 2024-01-26 ENCOUNTER — VIRTUAL VISIT (OUTPATIENT)
Dept: CONSULT | Facility: CLINIC | Age: 19
End: 2024-01-26
Attending: INTERNAL MEDICINE
Payer: COMMERCIAL

## 2024-01-26 DIAGNOSIS — K85.90 RECURRENT ACUTE PANCREATITIS: Primary | ICD-10-CM

## 2024-01-26 DIAGNOSIS — Z71.83 ENCOUNTER FOR NONPROCREATIVE GENETIC COUNSELING: ICD-10-CM

## 2024-01-26 DIAGNOSIS — R74.8 ELEVATED LIPASE: ICD-10-CM

## 2024-01-26 DIAGNOSIS — Z31.5 ENCOUNTER FOR PROCREATIVE GENETIC COUNSELING: ICD-10-CM

## 2024-01-26 DIAGNOSIS — K85.90 ACUTE PANCREATITIS, UNSPECIFIED COMPLICATION STATUS, UNSPECIFIED PANCREATITIS TYPE: ICD-10-CM

## 2024-01-26 PROCEDURE — 96040 HC GENETIC COUNSELING, EACH 30 MINUTES: CPT | Mod: GT,95 | Performed by: GENETIC COUNSELOR, MS

## 2024-01-26 NOTE — NURSING NOTE
How would you like to obtain your AVS? [employer]  If the video visit is dropped, the invitation should be resent by: Send to e-mail at: [email or web address]  Will anyone else be joining your video visit? No

## 2024-01-26 NOTE — LETTER
1/26/2024      RE: Ade Rodarte  [address]  [place] [address]       Genetic Counseling Consultation  This note is a summary of Ade Rodarte s virtual visit to [facility] on January 26th, 2024. He was referred to our clinic by Guru Sandra Jameson (MARY KAY) for genetic testing due to a history of recurrent acute pancreatitis. Genetic testing and a genetic counseling consultation was recommended to aid in better understanding the cause of pancreatitis, as well as provide additional information helpful in medical management and genetic risks for family members. I met with Ade by video visit today. For part of our visit, Ade's father Michael joined virtually by telephone.    Summary of visit:  1. Genetics of pancreatitis were briefly discussed, including known involved genes, inheritance patterns, and impact on family members.    2. Genetic testing options were discussed, and the 7 gene Chronic Pancreatitis Panel (CASR, CFTR, CPA1, CTRC, PRSS1, SPINK1, and TRPV6) was ordered through [facility].   3. Ade will arrange a blood draw for this genetic testing at a local [facility].  4. Results from there will take 2-3 weeks. I will call Ade at that time.  5. Follow up for Ade and his family will be pending genetic testing results.  6. Contact information was given for future questions or concerns that arise.    Personal Medical History:  Ade is a pleasant 18 year old male with a diagnosis of recurrent acute pancreatitis. Specifically, pancreatitis episodes are documented in September and November 2023 with elevated lipases, abdominal pain, and CT imaging. He will have a future EUS. Ade does NOT have a history of smoking, excessive use of alcohol, or other triggering factors and work-up for pancreatitis appears to be otherwise negative for known causative factors. Ade noted he did once test positive for COVID, though notes this was awhile before his  pancreatitis episodes. See Dr. Guru Sandra Jameson's note for full GI health history details.    Ade has otherwise been very healthy. 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 He denies personal history of recurrent lung and sinus infections, lung diseases, hearing loss, and intellectual disabilities/developmental delays. He is currently a civil engineering college student.    Family History:  A three generation pedigree was obtained today and sent to be scanned into the EMR. The family history was significant for the following:  Ade has a 17 year old brother and 14 year old sister, who are healthy. Ade and his siblings have no children at this time.  Ade's mother has HBP.   Of Cashs mother's siblings, one passed in adulthood. This sister's full health history is unknown, other than she had gotten sick with a goiter a few years prior to passing. Siblings and their children have no other known major health concerns.  Ade's maternal grandmother has age-related diagnoses only. Ade's maternal grandfather passed in his 90's.  Ade's father is healthy.   Ade's father has three siblings, one of whom has had a stroke. Of their children, one son (24 years old) has some health concerns with intellectual disabilities and requiring a feeding tube. His specific diagnosis was unknown today. Additional information regarding his diagnosis and any genetic testing done may give more information about any recurrence chance for other family members.   Ade's paternal grandmother passed at 90 years from dementia. Ade's paternal grandfather passed at 94 years with Parkinson's.   Extended paternal family history includes heart attacks.  The family history was negative for known individuals with diagnosed pancreatitis, pancreatic cancer, known genetic conditions, and infertility.  Ade is of [place] ancestry on his maternal side and  / ancestry on his paternal side. Consanguinity was denied.    General Genetic Background Information  Genes are the instruction code for our body, and tell our body how to grow and function. Our genes are in every cell of our body, and are packaged in our chromosomes.  Genes and chromosomes come in pairs. We inherit one copy out of each pair from our mother and one copy of each pair from our father. No one has control over which copy they pass on to their child since it is a random process.   Every person has two copies of each gene.     Genetics of Pancreatitis  There are many known factors for pancreatitis, including non-genetic (for example drinking, smoking, autoimmune, etc) and genetic (underlying genetic changes that increase risk). There are also likely unknown genetic and/or non-genetic factors that influence pancreatitis, which we may know more about with time. Some individuals with pancreatitis likely have a multifactorial inheritance (many factors contributing to their history) instead of just one or two large factors.     Knowns and unknowns about these causes of pancreatitis, focusing on hereditary pancreatitis, were discussed today. Hereditary pancreatitis can be defined based on family history criteria or on genetic findings in an individual. One of the genes that is causative and associated with high risk for pancreatitis is PRSS1. This gene can also cause an increased risk for pancreatic cancer.  Many other genes, such as SPINK1, CTRC, CPA1, CaSR, and others are thought to be moderate risk genes and have an association with pancreatitis. Additionally idiopathic pancreatitis has been found to be associated with changes in the gene for cystic fibrosis (CFTR) as well. The variability in the inheritance of genes associated with hereditary pancreatitis was discussed.      Typical hereditary pancreatitis is a disease that is primarily inherited in an autosomal dominant manner, meaning  that a change in one copy of a gene, like PRSS1, is needed to develop the condition. One other gene known to be associated with hereditary pancreatitis, CFTR, typically causes disease when inherited in an autosomal recessive manner, meaning that an individual must inherit a change in the CFTR gene from both their mother and father. The risk factor genes are also inherited in an autosomal dominant fashion, but with these genes most individuals that carry a change do not get pancreatitis. We discussed the complexity of genetic testing for hereditary pancreatitis and that further discussion would be needed when we have the results.    If a known disease causing change was found, this would carry up to a 50% potential risk for future children and siblings to inherit the genetic change, and a 50% chance of not inheriting it. Not all individuals with genetic changes in these genes go on to develop pancreatitis, and each gene is associated with a different risk for the development of pancreatitis.     If a change is found in the CFTR gene, this result would have additional implications (such as chance for cystic fibrosis in an individual and/or their children) and they will be discussed further if applicable. Brief information about cystic fibrosis, its associated symptoms (lung infections and complications, pancreatitis, sinus disease, and/or male infertility), and its variability were discussed today.     If a specific genetic variant in a family can be identified, more information about risk for other family members and familial testing would be available.    Genetic Testing  There are genetic saliva or blood tests available that can help determine if an individual has changes in genes associated with pancreatitis (including PRSS1, SPINK1, CTRC, and CFTR genes). As the cause of Ade's pancreatitis is unclear and a genetic factor suspected, comprehensive genetic testing will give useful information to aid in  directing medical management, reproductive risks, and family member risks. Specifically, if an underlying explanation for Ade's pancreatis can be found, it may give more information about how to appropriate manage him or give information about what to expect. In addition, it is possible an underlying genetic cause may predispose Ade to other health risks. Knowing about these additional health risks can help us stay ahead of his healthcare to more appropriately screen for and potentially prevent other complications. Lastly, discovering an underlying reason may help predict the chance for other family members to have pancreatitis and/or other genetic conditions, such as cystic fibrosis. If a large genetic pancreatitis risk is found, further information about recurrence risk and possible reproductive options would be available for any future children for Ade. Given the above and Ade's young age of onset for his pancreatitis, genetic testing assessing hereditary pancreatitis factors is beneficial and medically necessary for Ade.    Limitations and risks of genetic testing were also discussed, including that our genetic testing in 2024 is only as good as our current knowledge of genetics and genes. Therefore, a negative test result does not rule out all genetic changes and causes of pancreatitis. Other follow up may be recommended in the future.     Ade elected to proceed with pancreatitis genetic testing through [facility] 7 gene Chronic Pancreatitis Panel (CASR, CFTR, CPA1, CTRC, PRSS1, SPINK1, and TRPV6). Consent was received verbally. Ade elected to proceed with testing via a blood draw, which he will coordinate at a local [facility] ([facility]). Results from there will take approximately 2-3 weeks, at which time I will call him. Follow up for Ade and his family would depend on genetic testing results.    Lab results may be automatically released via [facility]  protocol is to hold genetic testing results until we have reviewed them. We will then contact the family directly to disclose the results and ensure they receive a copy of the report. This protocol was reviewed with Ade, who was in agreement to hold the results for genetics review and direct contact.    An [facility]  reaches out to a patient by phone after testing is completed to discuss billing program options, including the application process for our patient assistance program, if out of pocket cost is over $100. Based on Ade's state insurance plan, I am hopeful that OOP cost will be low for Ade. After discussion of this information, Ade felt comfortable proceeding today.    Result Outcomes  Once results are available, we discussed the following possibilities:  1. One disease causing mutation found: Individuals with typical hereditary pancreatitis typically have one disease causing change detected.  Depending on the change found and other factors, individuals are at risk for development of pancreatitis, and other family members would be at risk.  2. One or two possibly contributory mutation found:  This result has various implications depending the specific change found.  The change(s) may contribute to disease.   3. Two mutations in the gene for CF:  Individuals with cystic fibrosis or cystic fibrosis-related disorder usually have two mutations in this gene, one inherited from their mother and one from their father. Depending on the changes in the gene and other factors, some people may have respiratory, GI symptoms, and/or infertility issues.   4. No mutations in these genes: No genetic evidence to support hereditary pancreatitis at this time. It is possible we will learn more about the genetics of pancreatitis in the future and be able to offer more comprehensive genetic testing.   5. The finding of an unknown change: This happens when the laboratory detects a  change but they do not know if it is found only in individuals with the condition, or if the change is found in individuals without pancreatitis as well. If you have this type of result, we will discuss it further at that time.    Plan  Consent was obtained and orders were placed for [facility]'s Chronic Pancreatitis Panel (CASR, CFTR, CPA1, CTRC, PRSS1, SPINK1 genes) for Ade. A blood draw will be obtained at a [facility].  Results will be returned to us 2-3 weeks from there, and I will notify Ade of the results as soon as we receive them.   Additional medical management recommendations or family member recommendations will be discussed in the future based on results.  This test can detect many changes in the known genes associated with hereditary pancreatitis; however, there remain genetic contributions that are not well understood. Therefore, additional genetic counseling and genetic testing may be available in the future.        It was a pleasure to meet with Ade and his father virtually today. They had no additional questions. My contact information was given for future questions or concerns that arise.     Anita Sutherland MS, [facility]  Genetic Counseling  Genetics and Metabolism Division  [facility]   Phone: [phone]  Pager: [phone]        CC: PCP; Dr. Jameson

## 2024-01-30 ENCOUNTER — LAB (OUTPATIENT)
Dept: LAB | Facility: CLINIC | Age: 19
End: 2024-01-30
Payer: COMMERCIAL

## 2024-01-30 DIAGNOSIS — K85.90 RECURRENT ACUTE PANCREATITIS: ICD-10-CM

## 2024-01-30 DIAGNOSIS — R74.8 ELEVATED LIPASE: ICD-10-CM

## 2024-01-30 DIAGNOSIS — K85.90 ACUTE PANCREATITIS, UNSPECIFIED COMPLICATION STATUS, UNSPECIFIED PANCREATITIS TYPE: ICD-10-CM

## 2024-01-30 PROCEDURE — 36415 COLL VENOUS BLD VENIPUNCTURE: CPT

## 2024-01-30 PROCEDURE — 99000 SPECIMEN HANDLING OFFICE-LAB: CPT

## 2024-02-06 ENCOUNTER — TELEPHONE (OUTPATIENT)
Dept: GASTROENTEROLOGY | Facility: CLINIC | Age: 19
End: 2024-02-06
Payer: COMMERCIAL

## 2024-02-06 NOTE — TELEPHONE ENCOUNTER
Pre assessment completed for upcoming procedure.   (Please see previous telephone encounter notes for complete details)       Procedure details:    Arrival time and facility location reviewed.    Pre op exam needed? N/A    Designated  policy reviewed. Instructed to have someone stay 24  hours post procedure.     COVID policy reviewed.      Medication review:    NSAID medication(s): Ibuprofen (Advil, Motrin): HOLD 1 day before procedure.      Prep for procedure:     Procedure prep instructions reviewed.        Any additional information needed:  N/A      Patient  verbalized understanding and had no questions or concerns at this time.      Lottie Eng RN  Endoscopy Procedure Pre Assessment RN  [phone] option 4

## 2024-02-06 NOTE — TELEPHONE ENCOUNTER
Pre visit planning completed.      Procedure details:    Patient scheduled for Endoscopic ultrasound (EUS) on 2/20/2024.     Arrival time: 1330. Procedure time 1500    Pre op exam needed? N/A    Facility location: [facility]; [address] [place] [address], [address], [place] [address]    Sedation type: MAC    Indication for procedure: Acute pancreatitis, unspecified complication status, unspecified pancreatitis type [K85.90]       Chart review:     Electronic implanted devices? No    Recent diagnosis of diverticulitis within the last 6 weeks? N/A    Diabetic? No    Diabetic medication HOLDING recommendations: (if applicable)  Oral diabetic medications: N/A  Diabetic injectables: N/A  Insulin: N/A      Medication review:    Anticoagulants? No    NSAIDS? No NSAID medications per patient's medication list.  RN will verify with pre-assessment call.    Other medication HOLDING recommendations:  N/A      Prep for procedure:     Prep instructions sent via christie Eng RN  Endoscopy Procedure Pre Assessment RN  [phone] option 4

## 2024-02-08 ENCOUNTER — TELEPHONE (OUTPATIENT)
Dept: GASTROENTEROLOGY | Facility: CLINIC | Age: 19
End: 2024-02-08
Payer: COMMERCIAL

## 2024-02-08 LAB — SCANNED LAB RESULT: NORMAL

## 2024-02-08 NOTE — TELEPHONE ENCOUNTER
Caller: Ade  Reason for Reschedule/Cancellation (please be detailed, any staff messages or encounters to note?): patient having insurance issues      Prior to reschedule please review:  Ordering Provider: Trino  Sedation Determined: MAC  Does patient have any ASC Exclusions, please identify?: N      Notes on Cancelled Procedure:  Procedure: ENDOSCOPIC ULTRASOUND [EUS]   Date: 2/20/24  Location: [facility]; [address], [address] [place] [address]  Surgeon: Trino      Rescheduled: Yes  Procedure: ENDOSCOPIC ULTRASOUND [EUS]   Date: 4/16/24  Location: [facility]; [address], [address] [place] [address]  Surgeon: Trino  Sedation Level Scheduled  MAC,  Reason for Sedation Level Per order  Prep/Instructions updated and sent: Yes       Send In - basket message to Panc - Zak Pool if EUS  procedure is canceled or rescheduled: [ N/A, YES or NO] Yes

## 2024-02-09 ENCOUNTER — TELEPHONE (OUTPATIENT)
Dept: CONSULT | Facility: CLINIC | Age: 19
End: 2024-02-09
Payer: COMMERCIAL

## 2024-02-09 NOTE — TELEPHONE ENCOUNTER
I called Ade to discuss his pancreatitis genetic testing, which has returned. We reviewed that this testing was the 7 gene Chronic Pancreatitis Panel (CASR, CFTR, CPA1, CTRC, PRSS1, SPINK1, and TRPV6 genes) from [facility].     We discussed this pancreatitis genetic testing returned negative/normal for Ade, meaning no pathogenic variants were identified in these genes. No suspicious variants of uncertain significance were identified either. Therefore, a single genetic explanation for Ade's pancreatitis was NOT found on this genetic testing.      We discussed that this result greatly reduces the chance Ade has one of the tested pancreatitis genetic causes/conditions. While this reduces the chance of a single genetic condition/cause for one's pancreatitis and increases the chance that it is instead due to non-genetic or multifactorial causes instead, we reviewed that all genetic testing has limitations based on our current knowledge of genes and genetics. Therefore, it is certainly possible that with time we will know more about additional large and/or many small genetic pancreatitis risk factors. Ade would therefore be recommended to keep the team updated with any family history of pancreatitis or major health history changes that occur, so we can continue to assess if updated pancreatitis genetic testing is available and warranted.      These results will be shared with Ade's GI team for his on-going management. I defer next steps to GI.     Since no large pancreatitis genetic risk factor was found for Ade, we did not find anything that would put his other biologic family members at known high risk for pancreatitis on a genetic level. However, this certainly does not rule out pancreatitis and follow up would of course still be recommended for family members with any pancreatitis symptoms.     Lastly, while not reviewed today, we previously discussed the family history of one of  Ade's paternal cousins having some type of diagnosis with an intellectual disability and feeding tube. Additional information regarding this cousin's exact diagnosis and any genetic testing done may give more information about any recurrence chance for other family members.      Ade had no other questions at this time and was appreciative of the update. My number was previously given for questions/concerns. A copy of results and summary of our discussion will be sent by mail.        Anita Sutherland MS, [facility]  Genetic Counseling  Genetics and Metabolism Division  [facility]   Phone: [phone]  Pager: [phone]  Email: [email or web address]

## 2024-02-09 NOTE — PROGRESS NOTES
Genetic Counseling Consultation  This note is a summary of Ade Rodarte s virtual visit to [facility] on January 26th, 2024. He was referred to our clinic by Guru Sandra Jameson (MARY KAY) for genetic testing due to a history of recurrent acute pancreatitis. Genetic testing and a genetic counseling consultation was recommended to aid in better understanding the cause of pancreatitis, as well as provide additional information helpful in medical management and genetic risks for family members. I met with Ade by video visit today. For part of our visit, Ade's father Michael joined virtually by telephone.    Summary of visit:  1. Genetics of pancreatitis were briefly discussed, including known involved genes, inheritance patterns, and impact on family members.    2. Genetic testing options were discussed, and the 7 gene Chronic Pancreatitis Panel (CASR, CFTR, CPA1, CTRC, PRSS1, SPINK1, and TRPV6) was ordered through [facility].   3. Ade will arrange a blood draw for this genetic testing at a local [facility].  4. Results from there will take 2-3 weeks. I will call Ade at that time.  5. Follow up for Ade and his family will be pending genetic testing results.  6. Contact information was given for future questions or concerns that arise.    Personal Medical History:  Ade is a pleasant 18 year old male with a diagnosis of recurrent acute pancreatitis. Specifically, pancreatitis episodes are documented in September and November 2023 with elevated lipases, abdominal pain, and CT imaging. He will have a future EUS. Ade does NOT have a history of smoking, excessive use of alcohol, or other triggering factors and work-up for pancreatitis appears to be otherwise negative for known causative factors. Ade noted he did once test positive for COVID, though notes this was awhile before his pancreatitis episodes. See Dr. Guru Sandra Jameson's note for  full GI health history details.    Ade has otherwise been very healthy. 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 He denies personal history of recurrent lung and sinus infections, lung diseases, hearing loss, and intellectual disabilities/developmental delays. He is currently a civil engineering college student.    Family History:  A three generation pedigree was obtained today and sent to be scanned into the EMR. The family history was significant for the following:  Ade has a 17 year old brother and 14 year old sister, who are healthy. Ade and his siblings have no children at this time.  Ade's mother has HBP.   Of Ade's mother's siblings, one passed in adulthood. This sister's full health history is unknown, other than she had gotten sick with a goiter a few years prior to passing. Siblings and their children have no other known major health concerns.  Ade's maternal grandmother has age-related diagnoses only. Ade's maternal grandfather passed in his 90's.  Ade's father is healthy.   Ade's father has three siblings, one of whom has had a stroke. Of their children, one son (24 years old) has some health concerns with intellectual disabilities and requiring a feeding tube. His specific diagnosis was unknown today. Additional information regarding his diagnosis and any genetic testing done may give more information about any recurrence chance for other family members.   Ade's paternal grandmother passed at 90 years from dementia. Ade's paternal grandfather passed at 94 years with Parkinson's.   Extended paternal family history includes heart attacks.  The family history was negative for known individuals with diagnosed pancreatitis, pancreatic cancer, known genetic conditions, and infertility.  Ade is of [place] ancestry on his maternal side and / ancestry on his paternal side. Consanguinity was  denied.    General Genetic Background Information  Genes are the instruction code for our body, and tell our body how to grow and function. Our genes are in every cell of our body, and are packaged in our chromosomes.  Genes and chromosomes come in pairs. We inherit one copy out of each pair from our mother and one copy of each pair from our father. No one has control over which copy they pass on to their child since it is a random process.   Every person has two copies of each gene.     Genetics of Pancreatitis  There are many known factors for pancreatitis, including non-genetic (for example drinking, smoking, autoimmune, etc) and genetic (underlying genetic changes that increase risk). There are also likely unknown genetic and/or non-genetic factors that influence pancreatitis, which we may know more about with time. Some individuals with pancreatitis likely have a multifactorial inheritance (many factors contributing to their history) instead of just one or two large factors.     Knowns and unknowns about these causes of pancreatitis, focusing on hereditary pancreatitis, were discussed today. Hereditary pancreatitis can be defined based on family history criteria or on genetic findings in an individual. One of the genes that is causative and associated with high risk for pancreatitis is PRSS1. This gene can also cause an increased risk for pancreatic cancer.  Many other genes, such as SPINK1, CTRC, CPA1, CaSR, and others are thought to be moderate risk genes and have an association with pancreatitis. Additionally idiopathic pancreatitis has been found to be associated with changes in the gene for cystic fibrosis (CFTR) as well. The variability in the inheritance of genes associated with hereditary pancreatitis was discussed.      Typical hereditary pancreatitis is a disease that is primarily inherited in an autosomal dominant manner, meaning that a change in one copy of a gene, like PRSS1, is needed to develop  the condition. One other gene known to be associated with hereditary pancreatitis, CFTR, typically causes disease when inherited in an autosomal recessive manner, meaning that an individual must inherit a change in the CFTR gene from both their mother and father. The risk factor genes are also inherited in an autosomal dominant fashion, but with these genes most individuals that carry a change do not get pancreatitis. We discussed the complexity of genetic testing for hereditary pancreatitis and that further discussion would be needed when we have the results.    If a known disease causing change was found, this would carry up to a 50% potential risk for future children and siblings to inherit the genetic change, and a 50% chance of not inheriting it. Not all individuals with genetic changes in these genes go on to develop pancreatitis, and each gene is associated with a different risk for the development of pancreatitis.     If a change is found in the CFTR gene, this result would have additional implications (such as chance for cystic fibrosis in an individual and/or their children) and they will be discussed further if applicable. Brief information about cystic fibrosis, its associated symptoms (lung infections and complications, pancreatitis, sinus disease, and/or male infertility), and its variability were discussed today.     If a specific genetic variant in a family can be identified, more information about risk for other family members and familial testing would be available.    Genetic Testing  There are genetic saliva or blood tests available that can help determine if an individual has changes in genes associated with pancreatitis (including PRSS1, SPINK1, CTRC, and CFTR genes). As the cause of Ade's pancreatitis is unclear and a genetic factor suspected, comprehensive genetic testing will give useful information to aid in directing medical management, reproductive risks, and family member risks.  Specifically, if an underlying explanation for Ade's pancreatis can be found, it may give more information about how to appropriate manage him or give information about what to expect. In addition, it is possible an underlying genetic cause may predispose Ade to other health risks. Knowing about these additional health risks can help us stay ahead of his healthcare to more appropriately screen for and potentially prevent other complications. Lastly, discovering an underlying reason may help predict the chance for other family members to have pancreatitis and/or other genetic conditions, such as cystic fibrosis. If a large genetic pancreatitis risk is found, further information about recurrence risk and possible reproductive options would be available for any future children for Ade. Given the above and Ade's young age of onset for his pancreatitis, genetic testing assessing hereditary pancreatitis factors is beneficial and medically necessary for Ade.    Limitations and risks of genetic testing were also discussed, including that our genetic testing in 2024 is only as good as our current knowledge of genetics and genes. Therefore, a negative test result does not rule out all genetic changes and causes of pancreatitis. Other follow up may be recommended in the future.     Ade elected to proceed with pancreatitis genetic testing through [facility] 7 gene Chronic Pancreatitis Panel (CASR, CFTR, CPA1, CTRC, PRSS1, SPINK1, and TRPV6). Consent was received verbally. Ade elected to proceed with testing via a blood draw, which he will coordinate at a local [facility] ([facility] Results from there will take approximately 2-3 weeks, at which time I will call him. Follow up for Ade and his family would depend on genetic testing results.    Lab results may be automatically released via [employer].  Department protocol is to hold genetic testing results until we have reviewed them. We  will then contact the family directly to disclose the results and ensure they receive a copy of the report. This protocol was reviewed with Ade, who was in agreement to hold the results for genetics review and direct contact.    An [facility]  reaches out to a patient by phone after testing is completed to discuss billing program options, including the application process for our patient assistance program, if out of pocket cost is over $100. Based on Ade's state insurance plan, I am hopeful that OOP cost will be low for Ade. After discussion of this information, Ade felt comfortable proceeding today.    Result Outcomes  Once results are available, we discussed the following possibilities:  1. One disease causing mutation found: Individuals with typical hereditary pancreatitis typically have one disease causing change detected.  Depending on the change found and other factors, individuals are at risk for development of pancreatitis, and other family members would be at risk.  2. One or two possibly contributory mutation found:  This result has various implications depending the specific change found.  The change(s) may contribute to disease.   3. Two mutations in the gene for CF:  Individuals with cystic fibrosis or cystic fibrosis-related disorder usually have two mutations in this gene, one inherited from their mother and one from their father. Depending on the changes in the gene and other factors, some people may have respiratory, GI symptoms, and/or infertility issues.   4. No mutations in these genes: No genetic evidence to support hereditary pancreatitis at this time. It is possible we will learn more about the genetics of pancreatitis in the future and be able to offer more comprehensive genetic testing.   5. The finding of an unknown change: This happens when the laboratory detects a change but they do not know if it is found only in individuals with the  condition, or if the change is found in individuals without pancreatitis as well. If you have this type of result, we will discuss it further at that time.    Plan  Consent was obtained and orders were placed for [facility]'s Chronic Pancreatitis Panel (CASR, CFTR, CPA1, CTRC, PRSS1, SPINK1 genes) for Ade. A blood draw will be obtained at a [facility].  Results will be returned to us 2-3 weeks from there, and I will notify Ade of the results as soon as we receive them.   Additional medical management recommendations or family member recommendations will be discussed in the future based on results.  This test can detect many changes in the known genes associated with hereditary pancreatitis; however, there remain genetic contributions that are not well understood. Therefore, additional genetic counseling and genetic testing may be available in the future.        It was a pleasure to meet with Ade and his father virtually today. They had no additional questions. My contact information was given for future questions or concerns that arise.     Anita Sutherland MS, [facility]  Genetic Counseling  Genetics and Metabolism Division  [facility]   Phone: [phone]  Pager: [phone]        CC: PCP; Dr. Jameson    Video-Visit Details  Type of service:  Video Visit  Video Total time: 35 min  Originating Location (pt. Location): Home  Distant Location (provider location):  On-site  Mode of Communication:  Video Conference via [employer]

## 2024-02-12 DIAGNOSIS — Z00.6 EXAMINATION OF PARTICIPANT OR CONTROL IN CLINICAL RESEARCH: Primary | ICD-10-CM

## 2024-02-13 ENCOUNTER — LAB REQUISITION (OUTPATIENT)
Dept: LAB | Facility: CLINIC | Age: 19
End: 2024-02-13

## 2024-02-13 ENCOUNTER — ANCILLARY PROCEDURE (OUTPATIENT)
Dept: MRI IMAGING | Facility: CLINIC | Age: 19
End: 2024-02-13
Attending: PHYSICIAN ASSISTANT

## 2024-02-13 ENCOUNTER — ANCILLARY PROCEDURE (OUTPATIENT)
Dept: RADIOLOGY | Facility: CLINIC | Age: 19
End: 2024-02-13
Attending: PHYSICIAN ASSISTANT
Payer: COMMERCIAL

## 2024-02-13 DIAGNOSIS — Z00.6 EXAMINATION OF PARTICIPANT OR CONTROL IN CLINICAL RESEARCH: ICD-10-CM

## 2024-02-13 LAB
FASTING STATUS PATIENT QL REPORTED: ABNORMAL
FASTING STATUS PATIENT QL REPORTED: YES
GLUCOSE SERPL-MCNC: 100 MG/DL (ref 70–99)
GLUCOSE SERPL-MCNC: 102 MG/DL (ref 70–99)
HBA1C MFR BLD: 6 %
HBA1C MFR BLD: 6.1 %

## 2024-02-13 PROCEDURE — 82947 ASSAY GLUCOSE BLOOD QUANT: CPT | Performed by: PEDIATRICS

## 2024-02-13 PROCEDURE — 83036 HEMOGLOBIN GLYCOSYLATED A1C: CPT | Performed by: PEDIATRICS

## 2024-02-13 RX ORDER — GADOBUTROL 604.72 MG/ML
0.1 INJECTION INTRAVENOUS ONCE
Status: COMPLETED | OUTPATIENT
Start: 2024-02-13 | End: 2024-02-13

## 2024-02-13 RX ADMIN — GADOBUTROL 11.98 ML: 604.72 INJECTION INTRAVENOUS at 09:36

## 2024-04-01 ENCOUNTER — TELEPHONE (OUTPATIENT)
Dept: GASTROENTEROLOGY | Facility: CLINIC | Age: 19
End: 2024-04-01
Payer: COMMERCIAL

## 2024-04-01 NOTE — TELEPHONE ENCOUNTER
Pre assessment completed for upcoming procedure.   (Please see previous telephone encounter notes for complete details)    Patient  returned call.       Procedure details:    Arrival time and facility location reviewed.    Pre op exam needed? N/A    Designated  policy reviewed. Instructed to have someone stay 24  hours post procedure.       Medication review:    Medications reviewed. Please see supporting documentation below. Holding recommendations discussed (if applicable).       Prep for procedure:     Procedure prep instructions reviewed.        Any additional information needed:  N/A      Patient  verbalized understanding and had no questions or concerns at this time.      Lottie Eng RN  Endoscopy Procedure Pre Assessment RN  [phone] option 4

## 2024-04-01 NOTE — TELEPHONE ENCOUNTER
Pre visit planning completed.      Procedure details:    Patient scheduled for Endoscopic ultrasound (EUS) on 4/16/2024.     Arrival time: 0930. Procedure time 1100    Facility location: [facility]; [address] [place] [address], [address] Floor, [place] [address]. Check in location: Main entrance at registration desk.    Sedation type: MAC    Pre op exam needed? N/A    Indication for procedure: Acute pancreatitis, unspecified complication status, unspecified pancreatitis type [K85.90]       Chart review:     Electronic implanted devices? No    Recent diagnosis of diverticulitis within the last 6 weeks? N/A    Diabetic? No      Medication review:    Anticoagulants? No    NSAIDS? No NSAID medications per patient's medication list.  RN will verify with pre-assessment call.    Other medication HOLDING recommendations:  N/A      Prep for procedure:     Bowel prep recommendation: N/A    Prep instructions sent via christie Eng RN  Endoscopy Procedure Pre Assessment RN  [phone] option 4

## 2024-04-16 ENCOUNTER — ANESTHESIA EVENT (OUTPATIENT)
Dept: GASTROENTEROLOGY | Facility: CLINIC | Age: 19
End: 2024-04-16
Payer: COMMERCIAL

## 2024-04-16 ENCOUNTER — ANESTHESIA (OUTPATIENT)
Dept: GASTROENTEROLOGY | Facility: CLINIC | Age: 19
End: 2024-04-16
Payer: COMMERCIAL

## 2024-04-16 ENCOUNTER — HOSPITAL ENCOUNTER (OUTPATIENT)
Facility: CLINIC | Age: 19
Discharge: HOME | End: 2024-04-16
Attending: INTERNAL MEDICINE | Admitting: INTERNAL MEDICINE
Payer: COMMERCIAL

## 2024-04-16 ENCOUNTER — LAB REQUISITION (OUTPATIENT)
Dept: LAB | Facility: CLINIC | Age: 19
End: 2024-04-16

## 2024-04-16 VITALS
SYSTOLIC BLOOD PRESSURE: 94 MMHG | RESPIRATION RATE: 16 BRPM | BODY MASS INDEX: 28.87 KG/M2 | OXYGEN SATURATION: 95 % | DIASTOLIC BLOOD PRESSURE: 49 MMHG | HEART RATE: 80 BPM | WEIGHT: 262.79 LBS

## 2024-04-16 LAB
BICARBONATE BODY FLUID SOURCE: NORMAL
CO2 FLD-SCNC: 28 MMOL/L
UPPER EUS: NORMAL

## 2024-04-16 PROCEDURE — 250N000009 HC RX 250: Performed by: ANESTHESIOLOGY

## 2024-04-16 PROCEDURE — 250N000011 HC RX IP 250 OP 636: Performed by: ANESTHESIOLOGY

## 2024-04-16 PROCEDURE — 258N000003 HC RX IP 258 OP 636: Performed by: ANESTHESIOLOGY

## 2024-04-16 PROCEDURE — 82374 ASSAY BLOOD CARBON DIOXIDE: CPT | Performed by: PEDIATRICS

## 2024-04-16 PROCEDURE — 250N000011 HC RX IP 250 OP 636: Mod: JZ | Performed by: INTERNAL MEDICINE

## 2024-04-16 PROCEDURE — 370N000017 HC ANESTHESIA TECHNICAL FEE, PER MIN: Performed by: INTERNAL MEDICINE

## 2024-04-16 PROCEDURE — 43237 ENDOSCOPIC US EXAM ESOPH: CPT

## 2024-04-16 PROCEDURE — 43237 ENDOSCOPIC US EXAM ESOPH: CPT | Performed by: ANESTHESIOLOGY

## 2024-04-16 PROCEDURE — 43237 ENDOSCOPIC US EXAM ESOPH: CPT | Performed by: INTERNAL MEDICINE

## 2024-04-16 RX ORDER — OXYCODONE HYDROCHLORIDE 5 MG/1
5 TABLET ORAL
Status: DISCONTINUED | OUTPATIENT
Start: 2024-04-16 | End: 2024-04-16 | Stop reason: HOSPADM

## 2024-04-16 RX ORDER — ONDANSETRON 2 MG/ML
4 INJECTION INTRAMUSCULAR; INTRAVENOUS EVERY 30 MIN PRN
Status: DISCONTINUED | OUTPATIENT
Start: 2024-04-16 | End: 2024-04-16 | Stop reason: HOSPADM

## 2024-04-16 RX ORDER — ONDANSETRON 4 MG/1
4 TABLET, ORALLY DISINTEGRATING ORAL EVERY 30 MIN PRN
Status: DISCONTINUED | OUTPATIENT
Start: 2024-04-16 | End: 2024-04-16 | Stop reason: HOSPADM

## 2024-04-16 RX ORDER — HYDROMORPHONE HCL IN WATER/PF 6 MG/30 ML
0.2 PATIENT CONTROLLED ANALGESIA SYRINGE INTRAVENOUS EVERY 5 MIN PRN
Status: DISCONTINUED | OUTPATIENT
Start: 2024-04-16 | End: 2024-04-16 | Stop reason: HOSPADM

## 2024-04-16 RX ORDER — OXYCODONE HYDROCHLORIDE 10 MG/1
10 TABLET ORAL
Status: DISCONTINUED | OUTPATIENT
Start: 2024-04-16 | End: 2024-04-16 | Stop reason: HOSPADM

## 2024-04-16 RX ORDER — HYDROMORPHONE HCL IN WATER/PF 6 MG/30 ML
0.4 PATIENT CONTROLLED ANALGESIA SYRINGE INTRAVENOUS EVERY 5 MIN PRN
Status: DISCONTINUED | OUTPATIENT
Start: 2024-04-16 | End: 2024-04-16 | Stop reason: HOSPADM

## 2024-04-16 RX ORDER — FENTANYL CITRATE 50 UG/ML
25 INJECTION, SOLUTION INTRAMUSCULAR; INTRAVENOUS EVERY 5 MIN PRN
Status: DISCONTINUED | OUTPATIENT
Start: 2024-04-16 | End: 2024-04-16 | Stop reason: HOSPADM

## 2024-04-16 RX ORDER — PROPOFOL 10 MG/ML
INJECTION, EMULSION INTRAVENOUS PRN
Status: DISCONTINUED | OUTPATIENT
Start: 2024-04-16 | End: 2024-04-16

## 2024-04-16 RX ORDER — NALOXONE HYDROCHLORIDE 0.4 MG/ML
0.1 INJECTION, SOLUTION INTRAMUSCULAR; INTRAVENOUS; SUBCUTANEOUS
Status: DISCONTINUED | OUTPATIENT
Start: 2024-04-16 | End: 2024-04-16 | Stop reason: HOSPADM

## 2024-04-16 RX ORDER — LIDOCAINE 40 MG/G
CREAM TOPICAL
Status: DISCONTINUED | OUTPATIENT
Start: 2024-04-16 | End: 2024-04-16 | Stop reason: HOSPADM

## 2024-04-16 RX ORDER — PROPOFOL 10 MG/ML
INJECTION, EMULSION INTRAVENOUS CONTINUOUS PRN
Status: DISCONTINUED | OUTPATIENT
Start: 2024-04-16 | End: 2024-04-16

## 2024-04-16 RX ORDER — SODIUM CHLORIDE, SODIUM LACTATE, POTASSIUM CHLORIDE, CALCIUM CHLORIDE 600; 310; 30; 20 MG/100ML; MG/100ML; MG/100ML; MG/100ML
INJECTION, SOLUTION INTRAVENOUS CONTINUOUS
Status: DISCONTINUED | OUTPATIENT
Start: 2024-04-16 | End: 2024-04-16 | Stop reason: HOSPADM

## 2024-04-16 RX ORDER — SODIUM CHLORIDE, SODIUM LACTATE, POTASSIUM CHLORIDE, CALCIUM CHLORIDE 600; 310; 30; 20 MG/100ML; MG/100ML; MG/100ML; MG/100ML
INJECTION, SOLUTION INTRAVENOUS CONTINUOUS PRN
Status: DISCONTINUED | OUTPATIENT
Start: 2024-04-16 | End: 2024-04-16

## 2024-04-16 RX ORDER — FENTANYL CITRATE 50 UG/ML
50 INJECTION, SOLUTION INTRAMUSCULAR; INTRAVENOUS EVERY 5 MIN PRN
Status: DISCONTINUED | OUTPATIENT
Start: 2024-04-16 | End: 2024-04-16 | Stop reason: HOSPADM

## 2024-04-16 RX ORDER — LIDOCAINE HYDROCHLORIDE 20 MG/ML
INJECTION, SOLUTION INFILTRATION; PERINEURAL PRN
Status: DISCONTINUED | OUTPATIENT
Start: 2024-04-16 | End: 2024-04-16

## 2024-04-16 RX ORDER — ONDANSETRON 2 MG/ML
INJECTION INTRAMUSCULAR; INTRAVENOUS PRN
Status: DISCONTINUED | OUTPATIENT
Start: 2024-04-16 | End: 2024-04-16

## 2024-04-16 RX ADMIN — PROPOFOL 60 MG: 10 INJECTION, EMULSION INTRAVENOUS at 11:27

## 2024-04-16 RX ADMIN — PHENYLEPHRINE HYDROCHLORIDE 100 MCG: 10 INJECTION INTRAVENOUS at 11:49

## 2024-04-16 RX ADMIN — TOPICAL ANESTHETIC 1 SPRAY: 200 SPRAY DENTAL; PERIODONTAL at 11:21

## 2024-04-16 RX ADMIN — SODIUM CHLORIDE, POTASSIUM CHLORIDE, SODIUM LACTATE AND CALCIUM CHLORIDE: 600; 310; 30; 20 INJECTION, SOLUTION INTRAVENOUS at 11:20

## 2024-04-16 RX ADMIN — HUMAN SECRETIN 0.2 MCG: 16 INJECTION, POWDER, LYOPHILIZED, FOR SOLUTION INTRAVENOUS at 11:42

## 2024-04-16 RX ADMIN — HUMAN SECRETIN 23.64 MCG: 16 INJECTION, POWDER, LYOPHILIZED, FOR SOLUTION INTRAVENOUS at 11:43

## 2024-04-16 RX ADMIN — PROPOFOL 60 MG: 10 INJECTION, EMULSION INTRAVENOUS at 11:31

## 2024-04-16 RX ADMIN — LIDOCAINE HYDROCHLORIDE 100 MG: 20 INJECTION, SOLUTION INFILTRATION; PERINEURAL at 11:27

## 2024-04-16 RX ADMIN — PROPOFOL 150 MCG/KG/MIN: 10 INJECTION, EMULSION INTRAVENOUS at 11:22

## 2024-04-16 RX ADMIN — DEXMEDETOMIDINE HYDROCHLORIDE 16 MCG: 100 INJECTION, SOLUTION INTRAVENOUS at 11:34

## 2024-04-16 RX ADMIN — DEXMEDETOMIDINE HYDROCHLORIDE 20 MCG: 100 INJECTION, SOLUTION INTRAVENOUS at 11:19

## 2024-04-16 RX ADMIN — PROPOFOL 30 MG: 10 INJECTION, EMULSION INTRAVENOUS at 11:30

## 2024-04-16 RX ADMIN — ONDANSETRON 4 MG: 2 INJECTION INTRAMUSCULAR; INTRAVENOUS at 11:59

## 2024-04-16 ASSESSMENT — ACTIVITIES OF DAILY LIVING (ADL)
ADLS_ACUITY_SCORE: 37

## 2024-04-16 NOTE — OR NURSING
EUS under MAC.  Secretin collection at 0 and 10 minutes.  Anesthesia staff gave secretin.  Pt tolerated procedure.  Research assistant Anna took samples with her.

## 2024-04-16 NOTE — ANESTHESIA PREPROCEDURE EVALUATION
Anesthesia Pre-Procedure Evaluation    Patient: Ade Rodarte   MRN: [id number] : 2005        Procedure : Procedure(s):  Endoscopic ultrasound upper gastrointestinal tract (GI)          No past medical history on file.   History reviewed. No pertinent surgical history.   No Known Allergies   Social History     Tobacco Use    Smoking status: Never     Passive exposure: Never    Smokeless tobacco: Never   Substance Use Topics    Alcohol use: Never      Wt Readings from Last 1 Encounters:   23 119.8 kg (264 lb 1.6 oz) (>99%, Z= 2.60)*     * Growth percentiles are based on CDC (Boys, 2-20 Years) data.        Anesthesia Evaluation            ROS/MED HX  ENT/Pulmonary:  - neg pulmonary ROS     Neurologic:  - neg neurologic ROS     Cardiovascular:  - neg cardiovascular ROS     METS/Exercise Tolerance:     Hematologic:  - neg hematologic  ROS     Musculoskeletal:  - neg musculoskeletal ROS     GI/Hepatic:  - neg GI/hepatic ROS     Renal/Genitourinary:  - neg Renal ROS     Endo:  - neg endo ROS     Psychiatric/Substance Use:  - neg psychiatric ROS     Infectious Disease:  - neg infectious disease ROS     Malignancy:       Other:            Physical Exam    Airway        Mallampati: I   TM distance: > 3 FB   Neck ROM: full   Mouth opening: > 3 cm    Respiratory Devices and Support         Dental       (+) Completely normal teeth      Cardiovascular   cardiovascular exam normal          Pulmonary   pulmonary exam normal                OUTSIDE LABS:  CBC:   Lab Results   Component Value Date    WBC 5.1 2023    WBC 5.6 2023    HGB 14.4 2023    HGB 13.8 2023    HCT 43.0 2023    HCT 42.5 2023     2023     2023     BMP:   Lab Results   Component Value Date     2023     2023    POTASSIUM 4.1 2023    POTASSIUM 4.4 2023    CHLORIDE 102 2023    CHLORIDE 105 2023    CO2 21 (L) 2023    CO2 25 2023     "BUN 8.1 11/12/2023    BUN 7.6 11/11/2023    CR 0.79 12/14/2023    CR 0.76 11/12/2023     (H) 02/13/2024     (H) 02/13/2024     COAGS: No results found for: \"PTT\", \"INR\", \"FIBR\"  POC: No results found for: \"BGM\", \"HCG\", \"HCGS\"  HEPATIC:   Lab Results   Component Value Date    ALBUMIN 4.4 11/12/2023    PROTTOTAL 7.5 11/12/2023    ALT 27 11/12/2023    AST 18 11/12/2023    ALKPHOS 65 11/12/2023    BILITOTAL 0.5 11/12/2023     OTHER:   Lab Results   Component Value Date    A1C 6.1 (H) 02/13/2024    A1C 6.0 (H) 02/13/2024    MELANIA 9.7 11/12/2023    LIPASE 979 (H) 11/12/2023    SED 3 11/10/2023       Anesthesia Plan    ASA Status:  1    NPO Status:  NPO Appropriate    Anesthesia Type: MAC.     - Reason for MAC: immobility needed   Induction: Intravenous, Propofol.   Maintenance: TIVA.        Consents    Anesthesia Plan(s) and associated risks, benefits, and realistic alternatives discussed. Questions answered and patient/representative(s) expressed understanding.     - Discussed:     - Discussed with:  Patient      - Extended Intubation/Ventilatory Support Discussed: No.      - Patient is DNR/DNI Status: No     Use of blood products discussed: No .     Postoperative Care       PONV prophylaxis: Ondansetron (or other 5HT-3)     Comments:               Darling Cash MD    I have reviewed the pertinent notes and labs in the chart from the past 30 days and (re)examined the patient.  Any updates or changes from those notes are reflected in this note.                  "

## 2024-04-16 NOTE — DISCHARGE INSTRUCTIONS
Discharge Instructions after Endoscopic Ultrasound    Activity  You were given medicine for pain. You may be dizzy or sleepy.    For 24 hours:  Do not drive or use heavy equipment.  Do not make important decisions.  Do not drink any alcohol.    Diet  Wait one hour before eating or drinking. Start with sips of water. When your gag reflex has returned you  may go back to your usual diet, medicines and light exercise.    Discomfort  Some bloating is normal. You may have large burps or pass air.  You may have a sore throat for 2 to 3 days. It may help to:  Avoid hot liquids for 24 hours.  Use sore throat lozenges.  Gargle as needed with salt water up to 4 times a day. Mix 1 cup of warm water with 1 teaspoon of salt. Do not swallow.  You may take Tylenol (acetaminophen) for pain unless your doctor has told you not to.      Follow-up  ___ We took small tissue or fluid samples to study. We will call you with the results in about 10 working days.    When to call    Call right away if you have:  Severe throat pain or trouble swallowing  Black stools (tar-like looking bowel movement)  Fever above 100.6 F (37.5 C)  Unusual pain in belly or chest not relieved by belching or passing air.    If you vomit blood or have severe pain, go to an emergency room.    If you have questions, call    Monday to Friday, 8 a.m. to 4:30 p.m.:   Central Scheduling Department: [phone]  After hours: Hospital: [phone] (Ask for the GI fellow on call)

## 2024-04-16 NOTE — ANESTHESIA CARE TRANSFER NOTE
Patient: Ade Rodarte    Procedure: Procedure(s):  Endoscopic ultrasound upper gastrointestinal tract (GI)       Diagnosis: Acute pancreatitis, unspecified complication status, unspecified pancreatitis type [K85.90]  Diagnosis Additional Information: No value filed.    Anesthesia Type:   MAC     Note:    Oropharynx: oropharynx clear of all foreign objects  Level of Consciousness: drowsy  Oxygen Supplementation: nasal cannula  Level of Supplemental Oxygen (L/min / FiO2): 2  Independent Airway: airway patency satisfactory and stable  Dentition: dentition unchanged  Vital Signs Stable: post-procedure vital signs reviewed and stable  Report to RN Given: handoff report given  Patient transferred to: Phase II    Handoff Report: Identifed the Patient, Identified the Reponsible Provider, Reviewed the pertinent medical history, Discussed the surgical course, Reviewed Intra-OP anesthesia mangement and issues during anesthesia, Set expectations for post-procedure period and Allowed opportunity for questions and acknowledgement of understanding      Vitals:  Vitals Value Taken Time   BP 97/50 04/16/24 1206   Temp     Pulse     Resp     SpO2 95 % 04/16/24 1208   Vitals shown include unfiled device data.    Electronically Signed By: NOA Childers CRNA  April 16, 2024  12:09 PM

## 2024-04-16 NOTE — ANESTHESIA POSTPROCEDURE EVALUATION
Patient: Ade Rodarte    Procedure: Procedure(s):  Endoscopic ultrasound upper gastrointestinal tract (GI)       Anesthesia Type:  MAC    Note:  Disposition: Outpatient   Postop Pain Control: Uneventful            Sign Out: Well controlled pain   PONV: No   Neuro/Psych: Uneventful            Sign Out: Acceptable/Baseline neuro status   Airway/Respiratory: Uneventful            Sign Out: Acceptable/Baseline resp. status   CV/Hemodynamics: Uneventful            Sign Out: Acceptable CV status; No obvious hypovolemia; No obvious fluid overload   Other NRE: NONE   DID A NON-ROUTINE EVENT OCCUR? No           Last vitals:  Vitals Value Taken Time   BP 91/49 04/16/24 1215   Temp     Pulse     Resp     SpO2 94 % 04/16/24 1222   Vitals shown include unfiled device data.    Electronically Signed By: Darling Cash MD  April 16, 2024  12:23 PM

## 2024-04-20 ENCOUNTER — HEALTH MAINTENANCE LETTER (OUTPATIENT)
Age: 19
End: 2024-04-20

## 2024-07-17 DIAGNOSIS — Z00.6 EXAMINATION OF PARTICIPANT OR CONTROL IN CLINICAL RESEARCH: Primary | ICD-10-CM

## 2024-09-17 ENCOUNTER — OFFICE VISIT (OUTPATIENT)
Dept: FAMILY MEDICINE | Facility: CLINIC | Age: 19
End: 2024-09-17
Payer: COMMERCIAL

## 2024-09-17 VITALS
HEART RATE: 90 BPM | TEMPERATURE: 97.3 F | HEIGHT: 78 IN | SYSTOLIC BLOOD PRESSURE: 122 MMHG | BODY MASS INDEX: 30.31 KG/M2 | OXYGEN SATURATION: 97 % | DIASTOLIC BLOOD PRESSURE: 82 MMHG | RESPIRATION RATE: 15 BRPM | WEIGHT: 262 LBS

## 2024-09-17 DIAGNOSIS — Z11.59 NEED FOR HEPATITIS C SCREENING TEST: ICD-10-CM

## 2024-09-17 DIAGNOSIS — Z00.00 ROUTINE GENERAL MEDICAL EXAMINATION AT A HEALTH CARE FACILITY: Primary | ICD-10-CM

## 2024-09-17 DIAGNOSIS — Z11.4 SCREENING FOR HIV (HUMAN IMMUNODEFICIENCY VIRUS): ICD-10-CM

## 2024-09-17 LAB — HBA1C MFR BLD: 5.7 % (ref 0–5.6)

## 2024-09-17 PROCEDURE — 90656 IIV3 VACC NO PRSV 0.5 ML IM: CPT | Performed by: NURSE PRACTITIONER

## 2024-09-17 PROCEDURE — 36415 COLL VENOUS BLD VENIPUNCTURE: CPT | Performed by: NURSE PRACTITIONER

## 2024-09-17 PROCEDURE — 87591 N.GONORRHOEAE DNA AMP PROB: CPT | Performed by: NURSE PRACTITIONER

## 2024-09-17 PROCEDURE — 99385 PREV VISIT NEW AGE 18-39: CPT | Mod: 25 | Performed by: NURSE PRACTITIONER

## 2024-09-17 PROCEDURE — 83036 HEMOGLOBIN GLYCOSYLATED A1C: CPT | Performed by: NURSE PRACTITIONER

## 2024-09-17 PROCEDURE — 80053 COMPREHEN METABOLIC PANEL: CPT | Performed by: NURSE PRACTITIONER

## 2024-09-17 PROCEDURE — 87389 HIV-1 AG W/HIV-1&-2 AB AG IA: CPT | Performed by: NURSE PRACTITIONER

## 2024-09-17 PROCEDURE — 86803 HEPATITIS C AB TEST: CPT | Performed by: NURSE PRACTITIONER

## 2024-09-17 PROCEDURE — 90471 IMMUNIZATION ADMIN: CPT | Performed by: NURSE PRACTITIONER

## 2024-09-17 PROCEDURE — 87491 CHLMYD TRACH DNA AMP PROBE: CPT | Performed by: NURSE PRACTITIONER

## 2024-09-17 ASSESSMENT — PAIN SCALES - GENERAL: PAINLEVEL: NO PAIN (0)

## 2024-09-17 NOTE — PROGRESS NOTES
"Preventive Care Visit  [facility]  NOA Mendez CNP, Family Medicine  Sep 17, 2024      Assessment & Plan     Routine general medical examination at a health care facility  Ade is a 18 year old, with past medical history of idiopathic pancreatitis.   The patient has been in otherwise good general health in the past.  - PRIMARY CARE FOLLOW-UP SCHEDULING  - Hemoglobin A1c  - Comprehensive metabolic panel  - INFLUENZA VACCINE,SPLIT VIRUS,TRIVALENT,PF(FLUZONE)  - Hemoglobin A1c  - Comprehensive metabolic panel    Screening for HIV (human immunodeficiency virus)  - HIV Antigen Antibody Combo  - Neisseria gonorrhoeae PCR  - Chlamydia trachomatis PCR  - HIV Antigen Antibody Combo  - Hepatitis C Screen Reflex to HCV RNA Quant and Genotype  - Neisseria gonorrhoeae PCR  - Chlamydia trachomatis PCR    Need for hepatitis C screening test  - Hepatitis C Screen Reflex to HCV RNA Quant and Genotype              BMI  Estimated body mass index is 28.78 kg/m  as calculated from the following:    Height as of this encounter: 2.032 m (6' 8\").    Weight as of this encounter: 118.8 kg (262 lb).   Weight management plan: Discussed healthy diet and exercise guidelines    Counseling  Appropriate preventive services were addressed with this patient via screening, questionnaire, or discussion as appropriate for fall prevention, nutrition, physical activity, Tobacco-use cessation, social engagement, weight loss and cognition.  Checklist reviewing preventive services available has been given to the patient.  Reviewed patient's diet, addressing concerns and/or questions.           Subjective   Ade is a 18 year old, with past medical history of idiopathic pancreatitis presenting for the following:  Establish Care ( Has had acute pancreatitis in the past. Alfonso/dad is with the patient today and mentioned possible concerns about type 2 diabetes.)        9/17/2024     1:44 PM   Additional Questions   Roomed by " Tg HO   Accompanied by Alfonso/father        Health Care Directive  Patient does not have a Health Care Directive or Living Will: Discussed advance care planning with patient; information given to patient to review.    HPI  Idiopathic pancreatitis: Last episode Nov. 2023. Has seen GI at the [facility] and [facility]  Per Dr. Unger at [facility], patient has undergone a thorough evaluation and patient has idiopathic pancreatis. He was advised to avoid tobacco and alcohol. Additionally, also to consider supplementing a diet rich in antioxidants with multivitamin containing vitamin A, C, E, and selenium.    [facility]     He is a student at [employer] pursuing , Nik year                 9/17/2024   General Health   How would you rate your overall physical health? Excellent   Feel stress (tense, anxious, or unable to sleep) Not at all            9/17/2024   Nutrition   Three or more servings of calcium each day? Yes   Diet: Regular (no restrictions)   How many servings of fruit and vegetables per day? (!) 2-3   How many sweetened beverages each day? 0-1            9/17/2024   Exercise   Days per week of moderate/strenous exercise 6 days            9/17/2024   Social Factors   Frequency of gathering with friends or relatives More than three times a week   Worry food won't last until get money to buy more No   Food not last or not have enough money for food? No   Do you have housing? (Housing is defined as stable permanent housing and does not include staying ouside in a car, in a tent, in an abandoned building, in an overnight shelter, or couch-surfing.) Yes   Are you worried about losing your housing? No   Lack of transportation? No   Unable to get utilities (heat,electricity)? No            9/17/2024   Dental   Dentist two times every year? Yes            5/15/2024   TB Screening   Were you born outside of the [place]? No     "          Today's PHQ-2 Score:       5/15/2024     3:18 PM   PHQ-2 ( 1999 [employer])   Q1: Little interest or pleasure in doing things 0   Q2: Feeling down, depressed or hopeless 0   PHQ-2 Score 0   Q1: Little interest or pleasure in doing things Not at all   Q2: Feeling down, depressed or hopeless Not at all   PHQ-2 Score 0         9/17/2024   Substance Use   Alcohol more than 3/day or more than 7/wk Not Applicable   Do you use any other substances recreationally? No        Social History     Tobacco Use    Smoking status: Never     Passive exposure: Never    Smokeless tobacco: Never   Substance Use Topics    Alcohol use: Never    Drug use: Never             9/17/2024   One time HIV Screening   Previous HIV test? No          9/17/2024   STI Screening   New sexual partner(s) since last STI/HIV test? No            9/17/2024   Contraception/Family Planning   Questions about contraception or family planning No           Reviewed and updated as needed this visit by Provider   Tobacco  Allergies  Meds  Problems  Med Hx  Surg Hx  Fam Hx                     Objective    Exam  /82   Pulse 90   Temp 97.3  F (36.3  C) (Tympanic)   Resp 15   Ht 2.032 m (6' 8\")   Wt 118.8 kg (262 lb)   SpO2 97%   BMI 28.78 kg/m     Estimated body mass index is 28.78 kg/m  as calculated from the following:    Height as of this encounter: 2.032 m (6' 8\").    Weight as of this encounter: 118.8 kg (262 lb).    Physical Exam  GENERAL: alert and no distress  EYES: Eyes grossly normal to inspection, PERRL and conjunctivae and sclerae normal  HENT: ear canals and TM's normal, nose and mouth without ulcers or lesions  NECK: no adenopathy, no asymmetry, masses, or scars  RESP: lungs clear to auscultation - no rales, rhonchi or wheezes  CV: regular rate and rhythm, normal S1 S2, no S3 or S4, no murmur, click or rub, no peripheral edema  ABDOMEN: soft, nontender, no hepatosplenomegaly, no masses and bowel sounds normal  MS: no gross " musculoskeletal defects noted, no edema  SKIN: no suspicious lesions or rashes  NEURO: Normal strength and tone, mentation intact and speech normal  PSYCH: mentation appears normal, affect normal/bright  : Exam declined by parent/patient. Reason for decline: Patient/Parental preference      Vision Screen  Vision Screen Details  Does the patient have corrective lenses (glasses/contacts)?: No  Vision Acuity Screen  Vision Acuity Tool: Fernández  RIGHT EYE: 10/8 (20/16)  LEFT EYE: 10/8 (20/16)    Hearing Screen  RIGHT EAR  1000 Hz on Level 40 dB (Conditioning sound): Pass  LEFT EAR  1000 Hz on Level 20 dB: Pass  500 Hz on Level 25 dB: Pass  Results  Hearing Screen Results: Pass        Signed Electronically by: NOA Mendez CNP

## 2024-09-17 NOTE — PATIENT INSTRUCTIONS
Patient Education   Preventive Care Advice   This is general advice given by our system to help you stay healthy. However, your care team may have specific advice just for you. Please talk to your care team about your preventive care needs.  Nutrition  Eat 5 or more servings of fruits and vegetables each day.  Try wheat bread, brown rice and whole grain pasta (instead of white bread, rice, and pasta).  Get enough calcium and vitamin D. Check the label on foods and aim for 100% of the RDA (recommended daily allowance).  Lifestyle  Exercise at least 150 minutes each week  (30 minutes a day, 5 days a week).  Do muscle strengthening activities 2 days a week. These help control your weight and prevent disease.  No smoking.  Wear sunscreen to prevent skin cancer.  Have a dental exam and cleaning every 6 months.  Yearly exams  See your health care team every year to talk about:  Any changes in your health.  Any medicines your care team has prescribed.  Preventive care, family planning, and ways to prevent chronic diseases.  Shots (vaccines)   HPV shots (up to age 26), if you've never had them before.  Hepatitis B shots (up to age 59), if you've never had them before.  COVID-19 shot: Get this shot when it's due.  Flu shot: Get a flu shot every year.  Tetanus shot: Get a tetanus shot every 10 years.  Pneumococcal, hepatitis A, and RSV shots: Ask your care team if you need these based on your risk.  Shingles shot (for age 50 and up)  General health tests  Diabetes screening:  Starting at age 35, Get screened for diabetes at least every 3 years.  If you are younger than age 35, ask your care team if you should be screened for diabetes.  Cholesterol test: At age 39, start having a cholesterol test every 5 years, or more often if advised.  Bone density scan (DEXA): At age 50, ask your care team if you should have this scan for osteoporosis (brittle bones).  Hepatitis C: Get tested at least once in your life.  STIs (sexually  transmitted infections)  Before age 24: Ask your care team if you should be screened for STIs.  After age 24: Get screened for STIs if you're at risk. You are at risk for STIs (including HIV) if:  You are sexually active with more than one person.  You don't use condoms every time.  You or a partner was diagnosed with a sexually transmitted infection.  If you are at risk for HIV, ask about PrEP medicine to prevent HIV.  Get tested for HIV at least once in your life, whether you are at risk for HIV or not.  Cancer screening tests  Cervical cancer screening: If you have a cervix, begin getting regular cervical cancer screening tests starting at age 21.  Breast cancer scan (mammogram): If you've ever had breasts, begin having regular mammograms starting at age 40. This is a scan to check for breast cancer.  Colon cancer screening: It is important to start screening for colon cancer at age 45.  Have a colonoscopy test every 10 years (or more often if you're at risk) Or, ask your provider about stool tests like a FIT test every year or Cologuard test every 3 years.  To learn more about your testing options, visit:   .  For help making a decision, visit:   https://bit.ly/ht13323.  Prostate cancer screening test: If you have a prostate, ask your care team if a prostate cancer screening test (PSA) at age 55 is right for you.  Lung cancer screening: If you are a current or former smoker ages 50 to 80, ask your care team if ongoing lung cancer screenings are right for you.  For informational purposes only. Not to replace the advice of your health care provider. Copyright   2023 [employer]. All rights reserved. Clinically reviewed by the [facility]. [employer] 695997 - REV 01/24.

## 2024-09-17 NOTE — PROGRESS NOTES
"  {PROVIDER CHARTING PREFERENCE:136513}    Silver Booker is a 18 year old, presenting for the following health issues:  Establish Care ( Has had acute pancreatitis in the past. Alfonso/dad is with the patient today and mentioned possible concerns about type 2 diabetes.)        9/17/2024     1:44 PM   Additional Questions   Roomed by Tg HO   Accompanied by Alfonso/father     HPI   Has seen GI at the [facility] and [facility]  Per Dr. Unger at [facility], patient has undergone a thorough evaluation and patient has idiopathic pancreatis. He was advised to avoid tobacco and alcohol. Additionally, also to consider supplementing a diet rich in antioxidants with multivitamin containing vitamin A, C, E, and selenium.       {MA/LPN/RN Pre-Provider Visit Orders- hCG/UA/Strep (Optional):937433}  {SUPERLIST (Optional):250981}  {additonal problems for provider to add (Optional):317065}    {ROS Picklists (Optional):444986}      Objective    /78 (BP Location: Left arm, Patient Position: Sitting, Cuff Size: Adult Regular)   Pulse 90   Temp 97.3  F (36.3  C) (Tympanic)   Resp 15   Ht 2.032 m (6' 8\")   Wt 118.8 kg (262 lb)   SpO2 97%   BMI 28.78 kg/m    Body mass index is 28.78 kg/m .  Physical Exam   {Exam List (Optional):252953}    {Diagnostic Test Results (Optional):864637}        Signed Electronically by: NOA Mendez CNP  {Email feedback regarding this note to [email or web address]   :383659}  "

## 2024-09-18 LAB
ALBUMIN SERPL BCG-MCNC: 4.7 G/DL (ref 3.5–5.2)
ALP SERPL-CCNC: 68 U/L (ref 40–150)
ALT SERPL W P-5'-P-CCNC: 44 U/L (ref 0–70)
ANION GAP SERPL CALCULATED.3IONS-SCNC: 10 MMOL/L (ref 7–15)
AST SERPL W P-5'-P-CCNC: 22 U/L (ref 0–45)
BILIRUB SERPL-MCNC: 0.2 MG/DL
BUN SERPL-MCNC: 13 MG/DL (ref 6–20)
C TRACH DNA SPEC QL NAA+PROBE: NEGATIVE
CALCIUM SERPL-MCNC: 9.4 MG/DL (ref 8.8–10.4)
CHLORIDE SERPL-SCNC: 105 MMOL/L (ref 98–107)
CREAT SERPL-MCNC: 0.87 MG/DL (ref 0.67–1.17)
EGFRCR SERPLBLD CKD-EPI 2021: >90 ML/MIN/1.73M2
GLUCOSE SERPL-MCNC: 92 MG/DL (ref 70–99)
HCO3 SERPL-SCNC: 27 MMOL/L (ref 22–29)
HCV AB SERPL QL IA: NONREACTIVE
HIV 1+2 AB+HIV1 P24 AG SERPL QL IA: NONREACTIVE
N GONORRHOEA DNA SPEC QL NAA+PROBE: NEGATIVE
POTASSIUM SERPL-SCNC: 4 MMOL/L (ref 3.4–5.3)
PROT SERPL-MCNC: 7.6 G/DL (ref 6.4–8.3)
SODIUM SERPL-SCNC: 142 MMOL/L (ref 135–145)

## 2024-10-08 ENCOUNTER — HOSPITAL ENCOUNTER (OUTPATIENT)
Dept: RESEARCH | Facility: CLINIC | Age: 19
Discharge: HOME | End: 2024-10-08
Attending: PEDIATRICS | Admitting: PEDIATRICS
Payer: COMMERCIAL

## 2024-10-08 ENCOUNTER — LAB REQUISITION (OUTPATIENT)
Dept: LAB | Facility: CLINIC | Age: 19
End: 2024-10-08

## 2024-10-08 DIAGNOSIS — Z00.6 EXAMINATION OF PARTICIPANT OR CONTROL IN CLINICAL RESEARCH: Primary | ICD-10-CM

## 2024-10-08 LAB
BASOPHILS # BLD AUTO: 0 10E3/UL (ref 0–0.2)
BASOPHILS NFR BLD AUTO: 0 %
CREAT SERPL-MCNC: 0.82 MG/DL (ref 0.67–1.17)
EGFRCR SERPLBLD CKD-EPI 2021: >90 ML/MIN/1.73M2
EOSINOPHIL # BLD AUTO: 0.2 10E3/UL (ref 0–0.7)
EOSINOPHIL NFR BLD AUTO: 3 %
ERYTHROCYTE [DISTWIDTH] IN BLOOD BY AUTOMATED COUNT: 13.6 % (ref 10–15)
EST. AVERAGE GLUCOSE BLD GHB EST-MCNC: 123 MG/DL
HBA1C MFR BLD: 5.9 %
HCT VFR BLD AUTO: 44.3 % (ref 40–53)
HGB BLD-MCNC: 14.7 G/DL (ref 13.3–17.7)
IMM GRANULOCYTES # BLD: 0 10E3/UL
IMM GRANULOCYTES NFR BLD: 0 %
LYMPHOCYTES # BLD AUTO: 1.9 10E3/UL (ref 0.8–5.3)
LYMPHOCYTES NFR BLD AUTO: 34 %
MCH RBC QN AUTO: 28.1 PG (ref 26.5–33)
MCHC RBC AUTO-ENTMCNC: 33.2 G/DL (ref 31.5–36.5)
MCV RBC AUTO: 85 FL (ref 78–100)
MONOCYTES # BLD AUTO: 0.3 10E3/UL (ref 0–1.3)
MONOCYTES NFR BLD AUTO: 5 %
NEUTROPHILS # BLD AUTO: 3.1 10E3/UL (ref 1.6–8.3)
NEUTROPHILS NFR BLD AUTO: 57 %
NRBC # BLD AUTO: 0 10E3/UL
NRBC BLD AUTO-RTO: 0 /100
PLATELET # BLD AUTO: 207 10E3/UL (ref 150–450)
RBC # BLD AUTO: 5.23 10E6/UL (ref 4.4–5.9)
WBC # BLD AUTO: 5.4 10E3/UL (ref 4–11)

## 2024-10-08 PROCEDURE — 510N000018 HC RESEARCH OGTT, PER HOUR

## 2024-10-08 PROCEDURE — 82565 ASSAY OF CREATININE: CPT | Performed by: PEDIATRICS

## 2024-10-08 PROCEDURE — 510N000009 HC RESEARCH FACILITY, PER 15 MIN

## 2024-10-08 PROCEDURE — 510N000017 HC CRU PATIENT CARE, PER 15 MIN

## 2024-10-08 PROCEDURE — 85004 AUTOMATED DIFF WBC COUNT: CPT | Performed by: PEDIATRICS

## 2024-10-08 PROCEDURE — 250N000009 HC RX 250: Performed by: PHYSICIAN ASSISTANT

## 2024-10-08 PROCEDURE — 83036 HEMOGLOBIN GLYCOSYLATED A1C: CPT | Performed by: PEDIATRICS

## 2024-10-08 RX ADMIN — ALCOHOL 75 G: 65 GEL TOPICAL at 09:10

## 2024-10-09 ENCOUNTER — ANCILLARY PROCEDURE (OUTPATIENT)
Dept: RADIOLOGY | Facility: CLINIC | Age: 19
End: 2024-10-09
Attending: PHYSICIAN ASSISTANT
Payer: COMMERCIAL

## 2024-10-09 DIAGNOSIS — Z00.6 EXAMINATION OF PARTICIPANT OR CONTROL IN CLINICAL RESEARCH: ICD-10-CM

## 2025-08-18 ENCOUNTER — PATIENT OUTREACH (OUTPATIENT)
Dept: CARE COORDINATION | Facility: CLINIC | Age: 20
End: 2025-08-18
Payer: COMMERCIAL